# Patient Record
Sex: MALE | Race: WHITE | Employment: OTHER | URBAN - METROPOLITAN AREA
[De-identification: names, ages, dates, MRNs, and addresses within clinical notes are randomized per-mention and may not be internally consistent; named-entity substitution may affect disease eponyms.]

---

## 2023-11-06 PROBLEM — M19.019 SHOULDER ARTHRITIS: Status: ACTIVE | Noted: 2023-11-06

## 2023-11-06 PROBLEM — S43.101A: Status: ACTIVE | Noted: 2023-11-06

## 2023-11-06 RX ORDER — SIMVASTATIN 40 MG/1
40 TABLET, FILM COATED ORAL
COMMUNITY
Start: 2015-10-27

## 2023-11-06 RX ORDER — LOSARTAN POTASSIUM 50 MG/1
50 TABLET ORAL
COMMUNITY
Start: 2015-10-27 | End: 2024-01-10 | Stop reason: SINTOL

## 2023-11-07 ENCOUNTER — APPOINTMENT (OUTPATIENT)
Dept: OTOLARYNGOLOGY | Facility: CLINIC | Age: 78
End: 2023-11-07
Payer: MEDICARE

## 2023-11-07 ENCOUNTER — CLINICAL SUPPORT (OUTPATIENT)
Dept: AUDIOLOGY | Facility: CLINIC | Age: 78
End: 2023-11-07
Payer: MEDICARE

## 2023-11-07 ENCOUNTER — OFFICE VISIT (OUTPATIENT)
Dept: OTOLARYNGOLOGY | Facility: CLINIC | Age: 78
End: 2023-11-07
Payer: MEDICARE

## 2023-11-07 VITALS — WEIGHT: 195 LBS | HEIGHT: 69 IN | BODY MASS INDEX: 28.88 KG/M2

## 2023-11-07 DIAGNOSIS — H90.3 SENSORINEURAL HEARING LOSS, BILATERAL: Primary | ICD-10-CM

## 2023-11-07 DIAGNOSIS — H90.3 SENSORINEURAL HEARING LOSS (SNHL) OF BOTH EARS: Primary | ICD-10-CM

## 2023-11-07 DIAGNOSIS — Z91.89 HEALTH, HAZARD: ICD-10-CM

## 2023-11-07 PROCEDURE — 1159F MED LIST DOCD IN RCRD: CPT | Performed by: OTOLARYNGOLOGY

## 2023-11-07 PROCEDURE — 99204 OFFICE O/P NEW MOD 45 MIN: CPT | Performed by: OTOLARYNGOLOGY

## 2023-11-07 PROCEDURE — 1160F RVW MEDS BY RX/DR IN RCRD: CPT | Performed by: OTOLARYNGOLOGY

## 2023-11-07 PROCEDURE — 92626 EVAL AUD FUNCJ 1ST HOUR: CPT

## 2023-11-07 PROCEDURE — 1036F TOBACCO NON-USER: CPT | Performed by: OTOLARYNGOLOGY

## 2023-11-07 PROCEDURE — 92627 EVAL AUD FUNCJ EA ADDL 15: CPT

## 2023-11-07 RX ORDER — HYDROGEN PEROXIDE 3 %
1 SOLUTION, NON-ORAL MISCELLANEOUS DAILY
COMMUNITY
Start: 2019-10-30

## 2023-11-07 RX ORDER — LISINOPRIL 40 MG/1
TABLET ORAL
COMMUNITY
Start: 2023-09-29 | End: 2024-01-10 | Stop reason: SINTOL

## 2023-11-07 ASSESSMENT — PATIENT HEALTH QUESTIONNAIRE - PHQ9
2. FEELING DOWN, DEPRESSED OR HOPELESS: NOT AT ALL
SUM OF ALL RESPONSES TO PHQ9 QUESTIONS 1 AND 2: 0
1. LITTLE INTEREST OR PLEASURE IN DOING THINGS: NOT AT ALL

## 2023-11-07 NOTE — PROGRESS NOTES
History Of Present Illness:  Neymar Hannon is a 78 y.o. male with a history of bilateral sensorineural hearing loss and bilateral tinnitus, she was referred here today by Akosua Rodgers CNP, for cochlear implant consultation. Patient has undergone audiometric evaluation and meets criteria in both ears, per CMS. He has done research on the implant and has a general idea of the procedure itself and the expected postoperative course. He denies any otalgia, otorrhea, vertigo, or other otologic findings. There are no other contributory factors. He has a history of hypertension and hyperlipidemia.     Surgical History:  He has a past surgical history that includes Other surgical history (12/10/2019).     Allergies:  Amlodipine-olmesartan    Medications:   Current Outpatient Medications   Medication Instructions    esomeprazole (NexIUM) 20 mg DR capsule 1 capsule, oral, Daily    lisinopril 40 mg tablet     losartan (COZAAR) 50 mg, oral    simvastatin (ZOCOR) 40 mg, oral      Review of Systems:   A comprehensive 10-point review of systems was obtained including constitutional, neurological, HEENT, pulmonary, cardiovascular, genito-urinary, and other pertinent systems and was negative except as noted in the HPI.      Physical Exam:  Constitutional   General appearance: Healthy-appearing, well-nourished, well groomed, in no acute distress.   Ability to communicate: Normal communication without aids, normal voice quality.       Head and face: Atraumatic with no masses, lesions, or scarring.   Facial strength: Normal strength and symmetry, no synkinesis or facial tic.     Ears  Otoscopic examination: Bilateral normal otoscopy of the tympanic membrane     Nose: Dorsum symmetric with no visible or palpable deformities.     Oral Cavity/Mouth  Lips, teeth, and gums: Normal lips, gums, and dentition.     Oropharynx: Mucosa moist, no lesions.     Neck: Symmetrical, trachea midline. No masses visible.    "  Neurological/Psychiatric  Cranial Nerve Examination: II - XII grossly intact.  Orientation to person, place, and time: Normal.  Mood and affect: Normal.     Skin: Normal without rashes or lesions.     Pulmonary  Respiratory effort: Chest expands symmetrically.     Cardiovascular: Good peripheral pulses  Peripheral vascular system: No varicosities, carotid pulse normal, no edema. No jugular venous distension.     Extremities: Appearance of extremities: Normal. Gait normal.     Last Recorded Vitals:  Height 1.753 m (5' 9\"), weight 88.5 kg (195 lb).    Audiometry:  Reviewed, dated 11/07/23  CI evaluation, Phonak P90-UP  CNC WRS 48% in right, 40% in left  AzBio WRS 39% in right, 34% in left     Assessment/Plan   78 y.o. male with a history of bilateral sensorineural hearing loss and bilateral tinnitus, she was referred here today by Akosua Rodgers CNP, for cochlear implant consultation. We had a long discussion on the indications and the expectations of and following surgery. At this time, we will plan for right-sided cochlear implantation. Patient agreeable to proceed and schedule.     Patient was seen and discussed with Dr. Mary Kay Boudreaux.     I saw and evaluated the patient. I personally obtained the key and critical portions of the history and physical exam or was physically present for key and critical portions performed by the resident/fellow. I reviewed the resident/fellow's documentation and discussed the patient with the resident/fellow. I agree with the resident/fellow's medical decision making as documented in the note.    Mary Kay Boudreaux MD    "

## 2023-11-07 NOTE — PATIENT INSTRUCTIONS
Welcome to Dr. Boudreaux's clinic. We are here to assist you through your ENT care at [facility].  Dr. Boudreaux is an Ear surgeon. This means that she specializes in taking care of patients with complex ear problems.     Dr. Boudreaux's office number is [phone]. While you may see her at a satellite office, she has a team committed to help meet your healthcare needs at [facility]'s [facility]. This number is the most direct way to communicate with the office.     Ludmila is Dr. Boudreaux's  and she answers the office phone from 8am-4pm Mon-Fri. She can help you with many general questions and information. Questions that she cannot answer will be directed to the appropriate staff. You may need to leave a message. In this case, someone from the team will call you back.     Prem is Dr. Boudreaux's primary nurse and can be reached by calling the office. Prem is in clinic with Dr. Boudreaux's on Mondays and Tuesdays. Non-urgent calls will be returned on non-clinic days typically Thursdays.     Sometimes, other team members will also be involved in your care. These people may include dieticians, social workers, speech therapists, audiologist, neurologist, and physical therapist. Dr. Boudreaux will provide these referrals as needed. Please let her know if you would like to request a specific referral.     For your convenience, Dr. Boudreaux sees patients at several [facility] locations including [facility] and [facility] at the main campus of [facility]. While we try to make your appointments as convenient as possible, occasionally a visit to another location may be necessary to provide the best care for you. We look forward to working with you to meet your healthcare goals.     Dr. Boudreaux makes every effort to run on time for your appointments. Therefore, if you are more than 30 minutes late unrelated to a scan or another appointment such therapy or audio you will have to reschedule.

## 2023-11-07 NOTE — LETTER
2023     Akosua Rodgers, APRN-CNP  [id number]  [facility] [address]    Patient: Neymar Hannon   YOB: 1945   Date of Visit: 2023       Dear Dr. Akosua Rodgers, APRN-CNP:    Thank you for referring Neymar Hannon to me for evaluation. Below are my notes for this consultation.  If you have questions, please do not hesitate to call me. I look forward to following your patient along with you.       Sincerely,     Kyleigh Lamas, Nba      CC: Mary Kay Boudreaux MD  ______________________________________________________________________________________    COCHLEAR IMPLANT EVALUATION: ADULT (candidate)    Name:  Neymar Hannon  :  1945  Age:  78 years  Date of Evaluation:  2023    HISTORY  Neymar was seen for a cochlear implant evaluation prior to a consultation with Dr. Mary Kay Boudreaux MD. He arrives with Phonak RICs obtained at a clinic in [place]. He reports he does not do well with his hearing aids, especially in noise.     RECALL   Patient reported intermittent aural fullness, bilaterally. Neymar reported non-bothersome tinnitus, bilaterally. He currently utilizes Phonak hearing instruments, bilaterally with some benefit, though difficulty with clarity of speech and in the presence of background noise. Patient noted his last hearing evaluation was approximately a year ago. When asked, patient denied dizziness and falls.       COCHLEAR IMPLANT EVALUATION FROM 2023:    AIDED TESTING  Otoscopy indicated clear ear canals and visible tympanic membranes, bilaterally.    All testing was completed in the soundfield at 0 degrees azimuth. Pure tone testing was obtained using frequency modulating (FM) stimuli, and SRT was obtained using monitored live voice (MLV). Word and sentence recognition testing was performed with recorded material at 50 dB HL (50 dB HL = 60 dB A).    R = Right Phonak Jenna P90 UP hearing aid (opposite ear plugged)   SRT = 45 dB HL  CNC words in quiet at 50 dB HL   52%  AzBio in quiet at 50 dB HL  = 48%  AzBio in noise at 50 dB HL with 40 dB noise  = 39%  Pure tone responses from 250 Hz - 6000 Hz were in the mild to moderate hearing loss range.     L = Left  Phonak Jenna P90 UP hearing aid (opposite ear plugged)  SRT = 45 dB HL  CNC words in quiet at 50 dB HL = 36%  AzBio in quiet at 50 dB HL  = 40%  AzBio in noise at 50 dB HL with 40 dB noise  = 34%  Pure tone responses from 250 Hz - 6000 Hz were in the mild to moderate hearing loss range.     QUALITY OF LIFE (QOL) SURVEY  QOL 35:                    Raw Score                         Converted Score  Communication               26                                         41.07  Emotional                        14                                          48.21  Entertainment                 15                                           45.35  Environment                   16                                           48.85  Listening Effort                11                                           31.73       Social                               18                                           60.38  Global                              29                                           44.98     RESULTS  Due to the lack of benefit from appropriate amplification, Neymar has expressed interest in a cochlear implant. An evaluation of the right ear with appropriately fit amplification revealed 39% performance on an open-set sentence recognition test. An evaluation of the left ear with appropriately fit amplification revealed 34% performance on an open-set sentence recognition test.  The audiologic findings demonstrate that Neymar has partial residual hearing for tonal stimuli and speech detection with hearing aids, demonstrating that the auditory cranial nerve can be stimulated. Testing shows poor sentence and word understanding, even with appropriately programmed hearing aids indicating a severe to profound functional impairment.     Neymar  has an inability to orient sounds in his environment and has even more difficulty understanding speech in noisy environments.  This is highly problematic for safety purposes and for communication. Thus, the nature of his deafness has dramatically and deleteriously affected her overall quality of life, localization, and communication. Not only bilateral severe to profound sensorineural is a FDA approved indication for cochlear implantation, but recent international consensus statement demonstrate the unquestionable benefits and need of cochlear implantation for these patients (Rosemarie CA, Gracie RH, More DS, et al. Unilateral Cochlear Implants for Severe, Profound, or Moderate Sloping to Profound Bilateral Sensorineural Hearing Loss: A Systematic Review and Consensus Statements. [employer]N Otolaryngol Head Neck Surg. 2020 Aug 27. doi: 10.1001).     On this basis, unaided and aided testing indicates Neymar is an audiologic candidate for a cochlear implant in both ears. Neymar and the family have been counseled regarding the post-operative cochlear implant protocol and are properly motivated and able to participate in the post cochlear implant rehabilitation program. I, and the  CI team have determined that this surgery is medically necessary for the treatment of Neymar sensorineural hearing loss in both ears.     Neymar was given a complete  CI team packet that includes general information about cochlear implants, information about follow-up and realistic expectations. The surgical process, post-operative effects, and follow-up appointments were also discussed.  The patient was provided with the  brochures and informational handouts.  Questions were answered to the best of my ability.     The patient was informed about the need for the Pneumovax 23 and the Prevnar 13 vaccines. They must consult with their primary care doctor regarding this vaccine.     The patient will be contacted to schedule a CT Scan  and medical evaluation.  The patient is to contact the clinic with any questions or concerns.    Neymar will return to clinic to complete a device selection and discuss realistic expectations.     TREATMENT PLAN  1. Follow up with one of our neuro-otologist/CI surgeons for cochlear implant consultation.  2. Return to audiology for additional counseling and device selection pending approval by the  CI team.  3. Continue use of binaural hearing aids during all waking moments. Return to managing audiologist for earmold re-make and programming as indicated.     Time spent with patient:  1 hour and 30 minutes    Completed by:  Nba Willett, CCC-A, [facility]  Licensed Audiologist

## 2023-11-07 NOTE — PROGRESS NOTES
COCHLEAR IMPLANT EVALUATION: ADULT (candidate)    Name:  Neymar Hannon  :  1945  Age:  78 years  Date of Evaluation:  2023    HISTORY  Neymar was seen for a cochlear implant evaluation prior to a consultation with Dr. Mary Kay Boudreaux MD. He arrives with Phonak RICs obtained at a clinic in [place]. He reports he does not do well with his hearing aids, especially in noise.     RECALL   Patient reported intermittent aural fullness, bilaterally. Neymar reported non-bothersome tinnitus, bilaterally. He currently utilizes Phonak hearing instruments, bilaterally with some benefit, though difficulty with clarity of speech and in the presence of background noise. Patient noted his last hearing evaluation was approximately a year ago. When asked, patient denied dizziness and falls.       COCHLEAR IMPLANT EVALUATION FROM 2023:    AIDED TESTING  Otoscopy indicated clear ear canals and visible tympanic membranes, bilaterally.    All testing was completed in the soundfield at 0 degrees azimuth. Pure tone testing was obtained using frequency modulating (FM) stimuli, and SRT was obtained using monitored live voice (MLV). Word and sentence recognition testing was performed with recorded material at 50 dB HL (50 dB HL = 60 dB A).    R = Right Phonak Jenna P90 UP hearing aid (opposite ear plugged)   SRT = 45 dB HL  CNC words in quiet at 50 dB HL  52%  AzBio in quiet at 50 dB HL  = 48%  AzBio in noise at 50 dB HL with 40 dB noise  = 39%  Pure tone responses from 250 Hz - 6000 Hz were in the mild to moderate hearing loss range.     L = Left  Phonak Jenna P90 UP hearing aid (opposite ear plugged)  SRT = 45 dB HL  CNC words in quiet at 50 dB HL = 36%  AzBio in quiet at 50 dB HL  = 40%  AzBio in noise at 50 dB HL with 40 dB noise  = 34%  Pure tone responses from 250 Hz - 6000 Hz were in the mild to moderate hearing loss range.     QUALITY OF LIFE (QOL) SURVEY  QOL 35:                    Raw Score                          Converted Score  Communication               26                                         41.07  Emotional                        14                                          48.21  Entertainment                 15                                           45.35  Environment                   16                                           48.85  Listening Effort                11                                           31.73       Social                               18                                           60.38  Global                              29                                           44.98     RESULTS  Due to the lack of benefit from appropriate amplification, Neymar has expressed interest in a cochlear implant. An evaluation of the right ear with appropriately fit amplification revealed 39% performance on an open-set sentence recognition test. An evaluation of the left ear with appropriately fit amplification revealed 34% performance on an open-set sentence recognition test.  The audiologic findings demonstrate that Neymar has partial residual hearing for tonal stimuli and speech detection with hearing aids, demonstrating that the auditory cranial nerve can be stimulated. Testing shows poor sentence and word understanding, even with appropriately programmed hearing aids indicating a severe to profound functional impairment.     Neymar has an inability to orient sounds in his environment and has even more difficulty understanding speech in noisy environments.  This is highly problematic for safety purposes and for communication. Thus, the nature of his deafness has dramatically and deleteriously affected her overall quality of life, localization, and communication. Not only bilateral severe to profound sensorineural is a FDA approved indication for cochlear implantation, but recent international consensus statement demonstrate the unquestionable benefits and need of cochlear implantation for these  patients (Rosemarie CA, Gracie RH, More DS, et al. Unilateral Cochlear Implants for Severe, Profound, or Moderate Sloping to Profound Bilateral Sensorineural Hearing Loss: A Systematic Review and Consensus Statements. AUT[employer]N Otolaryngol Head Neck Surg. 2020 Aug 27. doi: 10.1001).     On this basis, unaided and aided testing indicates Neymar is an audiologic candidate for a cochlear implant in both ears. Neymar and the family have been counseled regarding the post-operative cochlear implant protocol and are properly motivated and able to participate in the post cochlear implant rehabilitation program. I, and the  CI team have determined that this surgery is medically necessary for the treatment of Neymar sensorineural hearing loss in both ears.     Neymar was given a complete  CI team packet that includes general information about cochlear implants, information about follow-up and realistic expectations. The surgical process, post-operative effects, and follow-up appointments were also discussed.  The patient was provided with the  brochures and informational handouts.  Questions were answered to the best of my ability.     The patient was informed about the need for the Pneumovax 23 and the Prevnar 13 vaccines. They must consult with their primary care doctor regarding this vaccine.     The patient will be contacted to schedule a CT Scan and medical evaluation.  The patient is to contact the clinic with any questions or concerns.    Neymar will return to clinic to complete a device selection and discuss realistic expectations.     TREATMENT PLAN  1. Follow up with one of our neuro-otologist/CI surgeons for cochlear implant consultation.  2. Return to audiology for additional counseling and device selection pending approval by the  CI team.  3. Continue use of binaural hearing aids during all waking moments. Return to managing audiologist for earmold re-make and programming as indicated.     Time  spent with patient:  1 hour and 30 minutes    Completed by:  Nba Willett, CCC-A, [facility]  Licensed Audiologist

## 2023-11-10 PROBLEM — H90.3 SENSORINEURAL HEARING LOSS (SNHL) OF BOTH EARS: Status: ACTIVE | Noted: 2023-11-07

## 2023-11-10 RX ORDER — SODIUM CHLORIDE 9 MG/ML
100 INJECTION, SOLUTION INTRAVENOUS CONTINUOUS
Status: CANCELLED | OUTPATIENT
Start: 2023-11-10

## 2023-11-20 ENCOUNTER — TELEPHONE (OUTPATIENT)
Dept: OTOLARYNGOLOGY | Facility: CLINIC | Age: 78
End: 2023-11-20
Payer: MEDICARE

## 2023-11-20 DIAGNOSIS — H90.3 SENSORINEURAL HEARING LOSS (SNHL) OF BOTH EARS: ICD-10-CM

## 2023-11-20 NOTE — TELEPHONE ENCOUNTER
Patient forwarded vaccine records from outside PCP. Patient had Prevnar 13 in 2015. He will just need an updated Prevnar 20 vaccine prior to his cochlear implant surgery in Jan 2024. Patient is aware. He requests this to be sent to the [facility] pharmacy on file. Order prepped and waiting physician signature.

## 2023-12-04 ENCOUNTER — EVALUATION (OUTPATIENT)
Dept: SPEECH THERAPY | Facility: CLINIC | Age: 78
End: 2023-12-04
Payer: MEDICARE

## 2023-12-04 DIAGNOSIS — R48.8 OTHER SYMBOLIC DYSFUNCTIONS: ICD-10-CM

## 2023-12-04 DIAGNOSIS — H90.3 SENSORINEURAL HEARING LOSS (SNHL) OF BOTH EARS: Primary | ICD-10-CM

## 2023-12-04 DIAGNOSIS — R41.841 COGNITIVE COMMUNICATION DEFICIT: ICD-10-CM

## 2023-12-04 PROCEDURE — 92523 SPEECH SOUND LANG COMPREHEN: CPT | Mod: GN

## 2023-12-04 ASSESSMENT — PAIN SCALES - GENERAL: PAINLEVEL_OUTOF10: 0 - NO PAIN

## 2023-12-04 ASSESSMENT — PAIN - FUNCTIONAL ASSESSMENT: PAIN_FUNCTIONAL_ASSESSMENT: 0-10

## 2023-12-04 NOTE — PROGRESS NOTES
Speech-Language Pathology    Auditory Evaluation      Patient Name: Neymar Hannon  MRN: [id number]  Today's Date: 12/5/2023     Time Calculation  Start Time: 1300  Stop Time: 1430  Time Calculation (min): 90 min      Current Problem:  Patient Active Problem List   Diagnosis    Acromioclavicular joint separation, type 3, right, initial encounter    Shoulder arthritis    Sensorineural hearing loss (SNHL) of both ears    Cognitive communication deficit    Other symbolic dysfunctions      Recommendations:  NORMAL Cognitive Assessment  Recommendations: Aural Rehab 1-4 weeks S/P Cochlear Implant Activation       Auditory Skill Assessment:       Auditory Plan of Care:  Recommend Treatment: No  Discussed POC: Patient  The patient's family/caregiver was educated regarding appropriate motivation and expectations for a cochlear implant (CI) and the CI process.: Yes      Subjective   Current Problem: NORMAL Cognitive Assessment    General Visit Information:  Recommendations: Aural Rehab 1-4 weeks S/P Cochlear Implant Activation  Living Environment: Home  Language at home: Spoken English  Arrival: Independent  Reason for Referral: Cognitive assessment as part of cochlear implant candidacy determination  Certification Period Start Date: 12/04/23  Certification Period End Date: 03/04/24  Prior Level of Function: WFL    Provider:  Dr. Boudreaux- ENT  Kyleigh Lamas- Audiology     Pain:  Pain Assessment  Pain Assessment: 0-10  Pain Score: 0 - No pain    Objective     Baseline Observations:  Hearing Loss: Progressive (Over time)  Duration of Hearing Loss: 20+ years  Etiology of hearing loss: unknown  Current Amplification: Worn during evaluation  Left Ear: Hearing Aid  Right Ear: Hearing Aid  Amplification Worn During Evaluation: Yes  Hours Worn: Whenever awake    Resonance-Voice Assessment:  Assessments Used: Informal  Articulation Screening: Age appropriate  Nasal Resonance: Normal  Nasal Air Emissions: Not present  Voice: Normal  Speech  Inteligibility: 100%    Cognition Skill Assessment:  CLQT: Personal Facts Score: 8  CLQT: Personal Facts Function: WFL  CLQT: Symbol Cancellation Score: 12  CLQT: Symbol Cancellation Function: WFL  CLQT: Confrontational Naming Score: 10  CLQT: Confrontational Naming Function: WFL  CLQT: Clock Drawing Score: 13  CLQT: Clock Drawing Function: WFL  CLQT: Story Retelling Score: 8  CLQT: Story Retelling Function: WFL  CLQT: Symbol Trails Score: 8  CLQT: Symbol Trails Function: WFL  CLQT: Generative Naming Score: 4  CLQT: Generative Naming Function: WFL  CLQT: Design Memory Score: 6  CLQT: Design Memory Function: WFL  CLQT: Mazes Score: 8  CLQT: Mazes Function: WFL  CLQT: Design Generation Score: 8  CLQT: Design Generation Function: WFL  CLQT: Auditory Comprehension Score: 19  CLQT: Auditory Comprehension Function: WFL    SRCD - Severity Rating Cognitive Domains:  SRCD: Attention Score: 200  SRCD: Attention Ratin  SRCD: Attention Function: WFL  SRCD: Memory Score: 168  SRCD: Memory Ratin  SRCD: Memory Function: WFL  SRCD: Executive Functions Score: 28  SRCD: Executive Functions Ratin  SRCD: Executive Functions Function: WFL  SRCD: Language Score: 30  SRCD: Language Ratin  SRCD: Language Function: WFL  SRCD: Visuospatial Skills Score: 96  SRCD: Visuospatial Skills Ratin  SRCD:Visuospatial Skills Function: WFL  SRCD: Non-Linguistic Cognition Score: 42  SRCD: Non-Linguistic Cognition Ratin  SRCD: Non-Linguistic Cognition Function: WFL  SRCD: Linguistic Cognition Score: 49  SRCD: Linguistic Cognition Ratin  SRCD: Linguistic Cognition Function: WFL  SRCD: Clock Drawing Severity Rating Score: 13  SRCD: Clock Drawing Severity Rating Rate: 4  SRCD: Clock Drawing Severity Rating Function: WFL  SRCD: Generative Naming Perseveration Ratio Score: 0.00  SRCD: Generative Naming Perseveration Ratio Function: WFL  SRCD: Composite Severity Score Rating Function: 4.0  SRCD: Composite Severity Score Rating  Function: WFL    Auditory Education:  Treatment Performed Today: No  Individual(s) Educated: Patient  Verbal Education Provided: Results of Testing  Response to Educaton: Verbalized Understanding  Patient's Learning Preference(s): Explanation/Discussion  Patient/caregiver verbalized understanding and agreement.: Yes       12/04/23 [facility]   Adult Outpatient Education   Individual(s) Educated Patient   Verbal Education  test results, aural rehab after cochlear implantation   Diagnosis and Precautions Cognition within normal limits   Risk and Benefits Discussed with Patient/Caregiver/Other yes   Patient/Caregiver Demonstrated Understanding yes   Plan of Care Discussed and Agreed Upon yes   Patient Response to Education Patient/Caregiver Asked Appropriate Questions;Patient/Caregiver Performed Return Demonstration of Exercises/Activities;Patient/Caregiver Verbalized Understanding of Information   Education Comment Answered questions about Cochlear Implants, Aural Rehab, and Cochlear Implant Expectations

## 2023-12-05 PROBLEM — R48.8 OTHER SYMBOLIC DYSFUNCTIONS: Status: ACTIVE | Noted: 2023-12-05

## 2023-12-05 PROBLEM — R41.841 COGNITIVE COMMUNICATION DEFICIT: Status: ACTIVE | Noted: 2023-12-05

## 2023-12-05 ASSESSMENT — ENCOUNTER SYMPTOMS
DEPRESSION: 0
LOSS OF SENSATION IN FEET: 0
OCCASIONAL FEELINGS OF UNSTEADINESS: 0

## 2023-12-08 ENCOUNTER — CLINICAL SUPPORT (OUTPATIENT)
Dept: AUDIOLOGY | Facility: CLINIC | Age: 78
End: 2023-12-08
Payer: MEDICARE

## 2023-12-08 DIAGNOSIS — H90.3 SENSORINEURAL HEARING LOSS, BILATERAL: Primary | ICD-10-CM

## 2023-12-08 PROCEDURE — 92700 UNLISTED ORL SERVICE/PX: CPT

## 2023-12-08 NOTE — PROGRESS NOTES
COCHLEAR IMPLANT EVALUATION: ADULT (candidate)    Name:  Neymar Hannon  :  1945  Age:  78 years  Date of Evaluation:  2023    HISTORY  Neymar was seen for device selection after her was determined to be a cochlear implant candidate on 2023. After a consultation with Dr. Mary Kay Boudreaux MD he reports they will proceed with a right sided implant. He arrives with Phonak RICs obtained at a clinic in [place]. He reports he does not do well with his hearing aids, especially in noise.     RECALL   Patient reported intermittent aural fullness, bilaterally. Neymar reported non-bothersome tinnitus, bilaterally. He currently utilizes Phonak hearing instruments, bilaterally with some benefit, though difficulty with clarity of speech and in the presence of background noise. Patient noted his last hearing evaluation was approximately a year ago. When asked, patient denied dizziness and falls.     DEVICE SELECTION AND REALISTIC EXPECTATIONS FROM 2023  Order:  Beige EAS N8  Extra Battery  TV streamer  Mini Eric    Realistic expectations were discussed and form completed.  Questions answered in their entirety to the best of my ability.  Passed questions on to Nurse Sheba and Prem that are out of my scope. (Wedding ring and flight restrictions)  Had Lisa scheduled with Akosua Helton for 3/11/2024 as he wants to start [facility]    COCHLEAR IMPLANT EVALUATION FROM 2023:    AIDED TESTING  Otoscopy indicated clear ear canals and visible tympanic membranes, bilaterally.    All testing was completed in the soundfield at 0 degrees azimuth. Pure tone testing was obtained using frequency modulating (FM) stimuli, and SRT was obtained using monitored live voice (MLV). Word and sentence recognition testing was performed with recorded material at 50 dB HL (50 dB HL = 60 dB A).    R = Right Phonak Jenna P90 UP hearing aid (opposite ear plugged)   SRT = 45 dB HL  CNC words in quiet at 50 dB HL  52%  AzBio in quiet at 50 dB  Covid negative.  Discussed with mom.    HL  = 48%  AzBio in noise at 50 dB HL with 40 dB noise  = 39%  Pure tone responses from 250 Hz - 6000 Hz were in the mild to moderate hearing loss range.     L = Left  Phonak Jenna P90 UP hearing aid (opposite ear plugged)  SRT = 45 dB HL  CNC words in quiet at 50 dB HL = 36%  AzBio in quiet at 50 dB HL  = 40%  AzBio in noise at 50 dB HL with 40 dB noise  = 34%  Pure tone responses from 250 Hz - 6000 Hz were in the mild to moderate hearing loss range.     QUALITY OF LIFE (QOL) SURVEY  QOL 35:                    Raw Score                         Converted Score  Communication               26                                         41.07  Emotional                        14                                          48.21  Entertainment                 15                                           45.35  Environment                   16                                           48.85  Listening Effort                11                                           31.73       Social                               18                                           60.38  Global                              29                                           44.98     RESULTS  Due to the lack of benefit from appropriate amplification, Neymar has expressed interest in a cochlear implant. An evaluation of the right ear with appropriately fit amplification revealed 39% performance on an open-set sentence recognition test. An evaluation of the left ear with appropriately fit amplification revealed 34% performance on an open-set sentence recognition test.  The audiologic findings demonstrate that Neymar has partial residual hearing for tonal stimuli and speech detection with hearing aids, demonstrating that the auditory cranial nerve can be stimulated. Testing shows poor sentence and word understanding, even with appropriately programmed hearing aids indicating a severe to profound functional impairment.     Neymar has an inability to orient  sounds in his environment and has even more difficulty understanding speech in noisy environments.  This is highly problematic for safety purposes and for communication. Thus, the nature of his deafness has dramatically and deleteriously affected her overall quality of life, localization, and communication. Not only bilateral severe to profound sensorineural is a FDA approved indication for cochlear implantation, but recent international consensus statement demonstrate the unquestionable benefits and need of cochlear implantation for these patients (Rosemarie CA, Gracie RH, More DS, et al. Unilateral Cochlear Implants for Severe, Profound, or Moderate Sloping to Profound Bilateral Sensorineural Hearing Loss: A Systematic Review and Consensus Statements. AUTUMN Otolaryngol Head Neck Surg. 2020 Aug 27. doi: 10.1001).     On this basis, unaided and aided testing indicates Neymar is an audiologic candidate for a cochlear implant in both ears. Neymar and the family have been counseled regarding the post-operative cochlear implant protocol and are properly motivated and able to participate in the post cochlear implant rehabilitation program. I, and the  CI team have determined that this surgery is medically necessary for the treatment of Neymar sensorineural hearing loss in both ears.     Neymar was given a complete  CI team packet that includes general information about cochlear implants, information about follow-up and realistic expectations. The surgical process, post-operative effects, and follow-up appointments were also discussed.  The patient was provided with the  brochures and informational handouts.  Questions were answered to the best of my ability.     The patient was informed about the need for the Pneumovax 23 and the Prevnar 13 vaccines. They must consult with their primary care doctor regarding this vaccine.     The patient will be contacted to schedule a CT Scan and medical evaluation.  The  patient is to contact the clinic with any questions or concerns.    Neymar will return to clinic to complete a device selection and discuss realistic expectations.     TREATMENT PLAN  1. Follow up with one of our neuro-otologist/CI surgeons for cochlear implant consultation.  2. Return to audiology for additional counseling and device selection pending approval by the  CI team.  3. Continue use of binaural hearing aids during all waking moments. Return to managing audiologist for earmold re-make and programming as indicated.     Time spent with patient:  1 hour and 30 minutes    Completed by:  Nba Willett, CCC-A, [facility]  Licensed Audiologist

## 2023-12-08 NOTE — LETTER
2023     Mary Kay Boudreaux MD  [address]  [place] [address]    Patient: Neymar Hannon   YOB: 1945   Date of Visit: 2023       Dear Dr. Mary Kay Boudreaux MD:    Thank you for referring Neymar Hannon to me for evaluation. Below are my notes for this consultation.  If you have questions, please do not hesitate to call me. I look forward to following your patient along with you.       Sincerely,     Nba Allen      CC: No Recipients  ______________________________________________________________________________________    COCHLEAR IMPLANT EVALUATION: ADULT (candidate)    Name:  Neymar Hannon  :  1945  Age:  78 years  Date of Evaluation:  2023    HISTORY  Neymar was seen for device selection after her was determined to be a cochlear implant candidate on 2023. After a consultation with Dr. Mary Kay Boudreaux MD he reports they will proceed with a right sided implant. He arrives with Phonak RICs obtained at a clinic in [place]. He reports he does not do well with his hearing aids, especially in noise.     RECALL   Patient reported intermittent aural fullness, bilaterally. Neymar reported non-bothersome tinnitus, bilaterally. He currently utilizes Phonak hearing instruments, bilaterally with some benefit, though difficulty with clarity of speech and in the presence of background noise. Patient noted his last hearing evaluation was approximately a year ago. When asked, patient denied dizziness and falls.     DEVICE SELECTION AND REALISTIC EXPECTATIONS FROM 2023  Order:  Beige EAS N8  Extra Battery  TV streamer  Mini Eric    Realistic expectations were discussed and form completed.  Questions answered in their entirety to the best of my ability.  Passed questions on to Nurse Sheba and Prem that are out of my scope. (Wedding ring and flight restrictions)  Had Lisa scheduled with Akosua Helton for 3/11/2024 as he wants to start [facility] ASAP    COCHLEAR  IMPLANT EVALUATION FROM 11/7/2023:    AIDED TESTING  Otoscopy indicated clear ear canals and visible tympanic membranes, bilaterally.    All testing was completed in the soundfield at 0 degrees azimuth. Pure tone testing was obtained using frequency modulating (FM) stimuli, and SRT was obtained using monitored live voice (MLV). Word and sentence recognition testing was performed with recorded material at 50 dB HL (50 dB HL = 60 dB A).    R = Right Phonak Jenna P90 UP hearing aid (opposite ear plugged)   SRT = 45 dB HL  CNC words in quiet at 50 dB HL  52%  AzBio in quiet at 50 dB HL  = 48%  AzBio in noise at 50 dB HL with 40 dB noise  = 39%  Pure tone responses from 250 Hz - 6000 Hz were in the mild to moderate hearing loss range.     L = Left  Phonak Jenna P90 UP hearing aid (opposite ear plugged)  SRT = 45 dB HL  CNC words in quiet at 50 dB HL = 36%  AzBio in quiet at 50 dB HL  = 40%  AzBio in noise at 50 dB HL with 40 dB noise  = 34%  Pure tone responses from 250 Hz - 6000 Hz were in the mild to moderate hearing loss range.     QUALITY OF LIFE (QOL) SURVEY  QOL 35:                    Raw Score                         Converted Score  Communication               26                                         41.07  Emotional                        14                                          48.21  Entertainment                 15                                           45.35  Environment                   16                                           48.85  Listening Effort                11                                           31.73       Social                               18                                           60.38  Global                              29                                           44.98     RESULTS  Due to the lack of benefit from appropriate amplification, Neymar has expressed interest in a cochlear implant. An evaluation of the right ear with appropriately fit amplification revealed  39% performance on an open-set sentence recognition test. An evaluation of the left ear with appropriately fit amplification revealed 34% performance on an open-set sentence recognition test.  The audiologic findings demonstrate that Neymar has partial residual hearing for tonal stimuli and speech detection with hearing aids, demonstrating that the auditory cranial nerve can be stimulated. Testing shows poor sentence and word understanding, even with appropriately programmed hearing aids indicating a severe to profound functional impairment.     Neymar has an inability to orient sounds in his environment and has even more difficulty understanding speech in noisy environments.  This is highly problematic for safety purposes and for communication. Thus, the nature of his deafness has dramatically and deleteriously affected her overall quality of life, localization, and communication. Not only bilateral severe to profound sensorineural is a FDA approved indication for cochlear implantation, but recent international consensus statement demonstrate the unquestionable benefits and need of cochlear implantation for these patients (Rosemarie CA, Gracie RH, More DS, et al. Unilateral Cochlear Implants for Severe, Profound, or Moderate Sloping to Profound Bilateral Sensorineural Hearing Loss: A Systematic Review and Consensus Statements. AUTUMN Otolaryngol Head Neck Surg. 2020 Aug 27. doi: 10.1001).     On this basis, unaided and aided testing indicates Neymar is an audiologic candidate for a cochlear implant in both ears. Neymar and the family have been counseled regarding the post-operative cochlear implant protocol and are properly motivated and able to participate in the post cochlear implant rehabilitation program. I, and the  CI team have determined that this surgery is medically necessary for the treatment of Neymar sensorineural hearing loss in both ears.     Neymar was given a complete  CI team packet that  includes general information about cochlear implants, information about follow-up and realistic expectations. The surgical process, post-operative effects, and follow-up appointments were also discussed.  The patient was provided with the  brochures and informational handouts.  Questions were answered to the best of my ability.     The patient was informed about the need for the Pneumovax 23 and the Prevnar 13 vaccines. They must consult with their primary care doctor regarding this vaccine.     The patient will be contacted to schedule a CT Scan and medical evaluation.  The patient is to contact the clinic with any questions or concerns.    Neymar will return to clinic to complete a device selection and discuss realistic expectations.     TREATMENT PLAN  1. Follow up with one of our neuro-otologist/CI surgeons for cochlear implant consultation.  2. Return to audiology for additional counseling and device selection pending approval by the  CI team.  3. Continue use of binaural hearing aids during all waking moments. Return to managing audiologist for earmold re-make and programming as indicated.     Time spent with patient:  1 hour and 30 minutes    Completed by:  Nba Willett, CCC-A, [facility]  Licensed Audiologist

## 2023-12-11 DIAGNOSIS — Z79.2 PROPHYLACTIC ANTIBIOTIC: Primary | ICD-10-CM

## 2023-12-11 RX ORDER — AMOXICILLIN 500 MG/1
2000 TABLET, FILM COATED ORAL ONCE
Qty: 4 TABLET | Refills: 0 | Status: SHIPPED | OUTPATIENT
Start: 2023-12-11 | End: 2023-12-11

## 2023-12-29 DIAGNOSIS — Z96.60 STATUS POST JOINT REPLACEMENT: Primary | ICD-10-CM

## 2023-12-29 RX ORDER — AMOXICILLIN 500 MG/1
2000 TABLET, FILM COATED ORAL ONCE
Qty: 4 TABLET | Refills: 0 | Status: SHIPPED | OUTPATIENT
Start: 2023-12-29 | End: 2023-12-29

## 2024-01-10 ENCOUNTER — LAB (OUTPATIENT)
Dept: LAB | Facility: LAB | Age: 79
End: 2024-01-10
Payer: MEDICARE

## 2024-01-10 DIAGNOSIS — Z41.9 SURGERY, ELECTIVE: ICD-10-CM

## 2024-01-10 DIAGNOSIS — H90.3 SENSORINEURAL HEARING LOSS (SNHL) OF BOTH EARS: ICD-10-CM

## 2024-01-10 DIAGNOSIS — E78.2 MIXED HYPERLIPIDEMIA: ICD-10-CM

## 2024-01-10 LAB
ANION GAP SERPL CALC-SCNC: 14 MMOL/L (ref 10–20)
BUN SERPL-MCNC: 21 MG/DL (ref 6–23)
CALCIUM SERPL-MCNC: 9.5 MG/DL (ref 8.6–10.6)
CHLORIDE SERPL-SCNC: 104 MMOL/L (ref 98–107)
CO2 SERPL-SCNC: 27 MMOL/L (ref 21–32)
CREAT SERPL-MCNC: 1.34 MG/DL (ref 0.5–1.3)
EGFRCR SERPLBLD CKD-EPI 2021: 54 ML/MIN/1.73M*2
ERYTHROCYTE [DISTWIDTH] IN BLOOD BY AUTOMATED COUNT: 13.4 % (ref 11.5–14.5)
GLUCOSE SERPL-MCNC: 104 MG/DL (ref 74–99)
HCT VFR BLD AUTO: 42 % (ref 41–52)
HGB BLD-MCNC: 13.3 G/DL (ref 13.5–17.5)
MCH RBC QN AUTO: 28.2 PG (ref 26–34)
MCHC RBC AUTO-ENTMCNC: 31.7 G/DL (ref 32–36)
MCV RBC AUTO: 89 FL (ref 80–100)
NRBC BLD-RTO: 0 /100 WBCS (ref 0–0)
PLATELET # BLD AUTO: 174 X10*3/UL (ref 150–450)
POTASSIUM SERPL-SCNC: 4.3 MMOL/L (ref 3.5–5.3)
RBC # BLD AUTO: 4.72 X10*6/UL (ref 4.5–5.9)
SODIUM SERPL-SCNC: 141 MMOL/L (ref 136–145)
WBC # BLD AUTO: 8.9 X10*3/UL (ref 4.4–11.3)

## 2024-01-10 PROCEDURE — 85027 COMPLETE CBC AUTOMATED: CPT

## 2024-01-10 PROCEDURE — 80048 BASIC METABOLIC PNL TOTAL CA: CPT

## 2024-01-10 PROCEDURE — 36415 COLL VENOUS BLD VENIPUNCTURE: CPT

## 2024-01-10 RX ORDER — LISINOPRIL 40 MG/1
40 TABLET ORAL DAILY
COMMUNITY

## 2024-01-10 NOTE — PREPROCEDURE INSTRUCTIONS
Pre-Op Instructions & Checklist   Your surgery has been scheduled at [facility] at [address] [place], [address], [address], in the [facility]. Parking is to the left of the main entrance.  You will be contacted about the time of your surgery the day before your surgery. If you are unable to answer the phone, a detailed voicemail message will be left. Make sure that your voicemail box is not full so a message can be left. If you have not received a call by 3:00 pm you may call [phone] between the hours of 3:00 and 4:00 pm. Please be available by phone the night before/day of surgery in case there is a change in the schedule which may require you to arrive earlier/later.  14 DAYS BEFORE SURGERY STOP TAKING WEIGHT LOSS MEDICATIONS     7 DAYS BEFORE SURGERY STOP THESE MEDICATIONS:  Multiple Vitamins containing Vitamin E  Herbal supplements, Fish Oil, garlic pills, turmeric, CoQ enzyme  Stop taking aspirin, and aspirin-containing products, as well as NSAID's such as Advil, Motrin, Aleve, and Ibuprofen. Tylenol is okay to take for pain relief.  If you are currently taking Coumadin/Warfarin, we will have to coordinate that with your PCP &/or the Anticoagulation Clinic.    THE DAY BEFORE SURGERY:  *Do not eat any food after midnight the night before surgery.   *You are permitted to have clear liquids such as water, apple juice, plain tea or coffee (no milk or creamer), clear electrolyte-replenishing drinks such as Pedialyte, Gatorade, or Powerade (not yogurt or pulp-containing smoothies or juices such as orange juice) up to 2 hours before your surgery.    DAY OF SURGERY, TAKE THESE MEDICATIONS with a small sip of water (if it is not listed, do not take it):  Take: Esomeprazole (Nexium); simvastatin                 ON THE MORNING OF SURGERY:  *Shower either the night before your surgery or the morning of your surgery  *Do not use moisturizers, creams, lotions or perfume, or  make-up.  *Wear comfortable, loose fitting clothing.   *All jewelry and valuables should be left at home.  *Prosthetic devices such as contact lenses, hearing aids, dentures, eyelash extensions, hairpins and body piercings must be removed before surgery. Bring containers for eyeglasses/contacts, dentures, or hearing aids with you.  Diabetics: Please check fasting blood sugars upon waking up.  If fasting blood sugars are <80ml/dl, please drink 100ml/3oz. of apple juice no later than 2 hours prior to surgery.    BRING WITH YOU:   *Photo ID and insurance card  *Current list of medicines and allergies  *Pacemaker/Defibrillator/Heart stent cards  *Copy of your complete Advanced Directive/DHPOA-if applicable    SMOKING:  *Quitting smoking can make a huge difference to your health and recovery from surgery.    *If you need help with quitting, call [phone].  Alcohol:  *No alcoholic beverages for 48 hours before surgery.    AFTER OUTPATIENT SURGERY: Anu Jalloh  *A responsible adult MUST accompany you at the time of discharge and stay with you for 24 hours after your surgery.  *You may NOT drive yourself home after surgery.  *You may use a taxi or ride sharing service (D[employer], Uber) to return home ONLY if you are accompanied by a friend or family member.  *Instructions for resuming your medications will be provided by your surgeon.    CONTACT SURGEON'S OFFICE IF YOU DEVELOP:  * Fever =/> 100.4 F   * New respiratory symptoms (e.g. cough, shortness of breath, respiratory distress, sore throat)  * Recent loss of taste or smell  *Flu like symptoms such as headache, fatigue or gastrointestinal symptoms  * If you develop any open sores, shingles, burning or painful urination   AND/OR:  * You no longer wish to have the surgery.  * Any other personal circumstances change that may lead to the need to cancel or defer this surgery.  *You were admitted to any hospital within one week of your planned procedure.    If you have  any questions regarding these preoperative instructions you may call [phone]. If you have questions regarding you surgical procedure, or post-operative care/recovery please call your surgeon's office.    Link to [facility]  [email or web address]

## 2024-01-10 NOTE — CPM/PAT H&P
CPM/PAT Evaluation       Name: Neymar Hannon (Neymar Hannon)  /Age: 1945/78 y.o.     TELEMEDICINE ENCOUNTER  Patient was contacted by telephone for preadmission testing perioperative risk assessment prior to surgery.    CHIEF COMPLAINT  Bilateral sensorineural hearing loss    HPI  Neymar Hannon is a 78-year-old male complaining of bilateral hearing loss that has been getting worse over time.  Patient currently has bilateral hearing aids which are no longer as effective as they once were in assisting with speech understanding.  Patient denies any exposure to ototoxic drugs, history of chronic loud noise exposure, trauma to ears, or prior ear surgery. Patient has undergone audiometric evaluation and meets criteria in both ears for cochlear implant.  Patient now scheduled for right-sided cochlear implant on 2024 at [facility].    ACTIVE PROBLEMS  Patient Active Problem List   Diagnosis    Acromioclavicular joint separation, type 3, right, initial encounter    Shoulder arthritis    Sensorineural hearing loss (SNHL) of both ears    Cognitive communication deficit    Other symbolic dysfunctions     PAST MEDICAL HISTORY  Past Medical History:   Diagnosis Date    Acid reflux     Hyperlipidemia     Other specified health status     No pertinent past medical history     SURGICAL HISTORY  Past Surgical History:   Procedure Laterality Date    ADENOIDECTOMY      COLONOSCOPY      OTHER SURGICAL HISTORY Left 12/10/2019    shoulder arthroplasty    TONSILLECTOMY       ANESTHESIA HISTORY  Denies problems with anesthesia in the past such as PONV, prolonged sedation, awareness, dental damage, aspiration, cardiac arrest, difficult intubation, or unexpected hospital admissions.  Denies family history of malignant hyperthermia, or pseudocholinesterase deficiency.    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  Never smoker; EtOH: About 1 drink every 2 weeks; denies recreational drug  use.  Patient states he does not live a sedentary life.  He walks a mile most days and does regular exercise.  Patient states he is able to do moderate ADLs such as housework, light yard work.  Patient denies chest pain, JETER.  METS 4    FAMILY HISTORY  Family History   Problem Relation Name Age of Onset    Other (heart problem) Other family history      ALLERGIES  Allergies   Allergen Reactions    Amlodipine-Olmesartan Rash     MEDICATIONS  No current facility-administered medications for this encounter.    Current Outpatient Medications:     esomeprazole (NexIUM) 20 mg DR capsule, Take 1 capsule (20 mg) by mouth once daily., Disp: , Rfl:     lisinopril 40 mg tablet, Take 1 tablet (40 mg) by mouth once daily., Disp: , Rfl:     simvastatin (Zocor) 40 mg tablet, Take 1 tablet (40 mg) by mouth., Disp: , Rfl:     PHYSICAL EXAM  Deferred    AIRWAY EXAM  Deferred    VITALS  No vitals taken for telemedicine visit  Height: 5 feet 9 inches; weight: 195 pounds; BMI: 28.80    LABS  Lab Results   Component Value Date    WBC 6.9 07/05/2022    HGB 14.5 07/05/2022    HCT 44.9 07/05/2022    MCV 93 07/05/2022     (L) 07/05/2022       IMAGING  EKG from 07/05/2022  Indications  Priority: Routine  htn     Interpretation Summary    Sinus bradycardia with 1st degree AV block  Low voltage QRS  Borderline ECG  No previous ECGs available  Confirmed by Don Terry ([id number]) on 7/6/2022 9:58:55 AM  Measurements    P Axis 24 degrees         P Offset 166 ms         MI Interval 220 ms         Q Onset 227 ms         QTC Calculation(Bazett) 399 ms         QTC Fredericia 408 ms         R Axis -2 degrees          T Axis 11 degrees         Ventricular Rate 53 BPM         Atrial Rate 53 BPM         P Onset 117 ms          QRS Count 9 beats         QRS Duration 84 ms         QT Interval 426 ms         T Offset 440 ms               ASSESSMENT/PLAN  Bilateral sensorineural hearing loss  Right-sided cochlear implant      This note was created in part  upon personal review of patient's medical records.

## 2024-01-25 ENCOUNTER — ANESTHESIA EVENT (OUTPATIENT)
Dept: OPERATING ROOM | Facility: CLINIC | Age: 79
End: 2024-01-25
Payer: MEDICARE

## 2024-01-26 ENCOUNTER — DOCUMENTATION (OUTPATIENT)
Dept: AUDIOLOGY | Facility: HOSPITAL | Age: 79
End: 2024-01-26

## 2024-01-26 ENCOUNTER — HOSPITAL ENCOUNTER (OUTPATIENT)
Facility: CLINIC | Age: 79
Setting detail: OUTPATIENT SURGERY
Discharge: HOME | End: 2024-01-26
Attending: OTOLARYNGOLOGY | Admitting: OTOLARYNGOLOGY
Payer: MEDICARE

## 2024-01-26 ENCOUNTER — ANESTHESIA (OUTPATIENT)
Dept: OPERATING ROOM | Facility: CLINIC | Age: 79
End: 2024-01-26
Payer: MEDICARE

## 2024-01-26 VITALS
BODY MASS INDEX: 28.31 KG/M2 | DIASTOLIC BLOOD PRESSURE: 75 MMHG | WEIGHT: 197.75 LBS | HEART RATE: 70 BPM | TEMPERATURE: 97.3 F | OXYGEN SATURATION: 98 % | SYSTOLIC BLOOD PRESSURE: 159 MMHG | RESPIRATION RATE: 14 BRPM | HEIGHT: 70 IN

## 2024-01-26 DIAGNOSIS — G89.18 ACUTE POSTOPERATIVE PAIN: ICD-10-CM

## 2024-01-26 DIAGNOSIS — E78.2 MIXED HYPERLIPIDEMIA: ICD-10-CM

## 2024-01-26 DIAGNOSIS — Z41.9 SURGERY, ELECTIVE: ICD-10-CM

## 2024-01-26 DIAGNOSIS — H90.3 SENSORINEURAL HEARING LOSS (SNHL) OF BOTH EARS: Primary | ICD-10-CM

## 2024-01-26 PROCEDURE — 2780000003 HC OR 278 NO HCPCS: Performed by: OTOLARYNGOLOGY

## 2024-01-26 PROCEDURE — 3600000009 HC OR TIME - EACH INCREMENTAL 1 MINUTE - PROCEDURE LEVEL FOUR: Performed by: OTOLARYNGOLOGY

## 2024-01-26 PROCEDURE — 7100000001 HC RECOVERY ROOM TIME - INITIAL BASE CHARGE: Performed by: OTOLARYNGOLOGY

## 2024-01-26 PROCEDURE — 3700000002 HC GENERAL ANESTHESIA TIME - EACH INCREMENTAL 1 MINUTE: Performed by: OTOLARYNGOLOGY

## 2024-01-26 PROCEDURE — 3700000001 HC GENERAL ANESTHESIA TIME - INITIAL BASE CHARGE: Performed by: OTOLARYNGOLOGY

## 2024-01-26 PROCEDURE — L8614 COCHLEAR DEVICE: HCPCS | Performed by: OTOLARYNGOLOGY

## 2024-01-26 PROCEDURE — A4217 STERILE WATER/SALINE, 500 ML: HCPCS | Performed by: OTOLARYNGOLOGY

## 2024-01-26 PROCEDURE — 2500000004 HC RX 250 GENERAL PHARMACY W/ HCPCS (ALT 636 FOR OP/ED): Performed by: NURSE ANESTHETIST, CERTIFIED REGISTERED

## 2024-01-26 PROCEDURE — 7100000002 HC RECOVERY ROOM TIME - EACH INCREMENTAL 1 MINUTE: Performed by: OTOLARYNGOLOGY

## 2024-01-26 PROCEDURE — 2500000004 HC RX 250 GENERAL PHARMACY W/ HCPCS (ALT 636 FOR OP/ED): Performed by: OTOLARYNGOLOGY

## 2024-01-26 PROCEDURE — 2500000004 HC RX 250 GENERAL PHARMACY W/ HCPCS (ALT 636 FOR OP/ED): Performed by: ANESTHESIOLOGY

## 2024-01-26 PROCEDURE — 2500000001 HC RX 250 WO HCPCS SELF ADMINISTERED DRUGS (ALT 637 FOR MEDICARE OP): Performed by: OTOLARYNGOLOGY

## 2024-01-26 PROCEDURE — 2500000005 HC RX 250 GENERAL PHARMACY W/O HCPCS: Performed by: OTOLARYNGOLOGY

## 2024-01-26 PROCEDURE — 2500000002 HC RX 250 W HCPCS SELF ADMINISTERED DRUGS (ALT 637 FOR MEDICARE OP, ALT 636 FOR OP/ED): Performed by: ANESTHESIOLOGY

## 2024-01-26 PROCEDURE — 2720000007 HC OR 272 NO HCPCS: Performed by: OTOLARYNGOLOGY

## 2024-01-26 PROCEDURE — A69930 PR IMPLANT COCHLEAR DEVICE: Performed by: NURSE ANESTHETIST, CERTIFIED REGISTERED

## 2024-01-26 PROCEDURE — 2500000005 HC RX 250 GENERAL PHARMACY W/O HCPCS: Performed by: NURSE ANESTHETIST, CERTIFIED REGISTERED

## 2024-01-26 PROCEDURE — 7100000009 HC PHASE TWO TIME - INITIAL BASE CHARGE: Performed by: OTOLARYNGOLOGY

## 2024-01-26 PROCEDURE — 69930 IMPLANT COCHLEAR DEVICE: CPT | Performed by: OTOLARYNGOLOGY

## 2024-01-26 PROCEDURE — 7100000010 HC PHASE TWO TIME - EACH INCREMENTAL 1 MINUTE: Performed by: OTOLARYNGOLOGY

## 2024-01-26 PROCEDURE — 3600000004 HC OR TIME - INITIAL BASE CHARGE - PROCEDURE LEVEL FOUR: Performed by: OTOLARYNGOLOGY

## 2024-01-26 PROCEDURE — 99100 ANES PT EXTEME AGE<1 YR&>70: CPT | Performed by: ANESTHESIOLOGY

## 2024-01-26 PROCEDURE — A69930 PR IMPLANT COCHLEAR DEVICE: Performed by: ANESTHESIOLOGY

## 2024-01-26 DEVICE — PROCESSOR KIT, SINGLE N8: Type: IMPLANTABLE DEVICE | Site: EAR | Status: NON-FUNCTIONAL

## 2024-01-26 DEVICE — IMPLANTABLE DEVICE
Type: IMPLANTABLE DEVICE | Site: EAR | Status: FUNCTIONAL
Brand: COCHLEAR™ NUCLEUS® CI622 COCHLEAR IMPLANT WITH SLIM STRAIGHT ELECTRODE

## 2024-01-26 RX ORDER — SODIUM CHLORIDE, SODIUM LACTATE, POTASSIUM CHLORIDE, CALCIUM CHLORIDE 600; 310; 30; 20 MG/100ML; MG/100ML; MG/100ML; MG/100ML
100 INJECTION, SOLUTION INTRAVENOUS CONTINUOUS
Status: DISCONTINUED | OUTPATIENT
Start: 2024-01-26 | End: 2024-01-26 | Stop reason: HOSPADM

## 2024-01-26 RX ORDER — OXYCODONE HYDROCHLORIDE 5 MG/1
5 TABLET ORAL EVERY 4 HOURS PRN
Status: DISCONTINUED | OUTPATIENT
Start: 2024-01-26 | End: 2024-01-26 | Stop reason: HOSPADM

## 2024-01-26 RX ORDER — FENTANYL CITRATE 50 UG/ML
INJECTION, SOLUTION INTRAMUSCULAR; INTRAVENOUS AS NEEDED
Status: DISCONTINUED | OUTPATIENT
Start: 2024-01-26 | End: 2024-01-26

## 2024-01-26 RX ORDER — SODIUM CHLORIDE 0.9 G/100ML
IRRIGANT IRRIGATION AS NEEDED
Status: DISCONTINUED | OUTPATIENT
Start: 2024-01-26 | End: 2024-01-26 | Stop reason: HOSPADM

## 2024-01-26 RX ORDER — ONDANSETRON HYDROCHLORIDE 2 MG/ML
INJECTION, SOLUTION INTRAVENOUS AS NEEDED
Status: DISCONTINUED | OUTPATIENT
Start: 2024-01-26 | End: 2024-01-26

## 2024-01-26 RX ORDER — TRAMADOL HYDROCHLORIDE 50 MG/1
50 TABLET ORAL EVERY 8 HOURS PRN
Qty: 10 TABLET | Refills: 0 | Status: SHIPPED | OUTPATIENT
Start: 2024-01-26 | End: 2024-02-03 | Stop reason: HOSPADM

## 2024-01-26 RX ORDER — APREPITANT 40 MG/1
40 CAPSULE ORAL DAILY
Status: DISCONTINUED | OUTPATIENT
Start: 2024-01-26 | End: 2024-01-26 | Stop reason: HOSPADM

## 2024-01-26 RX ORDER — FENTANYL CITRATE 50 UG/ML
25 INJECTION, SOLUTION INTRAMUSCULAR; INTRAVENOUS EVERY 5 MIN PRN
Status: DISCONTINUED | OUTPATIENT
Start: 2024-01-26 | End: 2024-01-26 | Stop reason: HOSPADM

## 2024-01-26 RX ORDER — CEFAZOLIN 1 G/1
INJECTION, POWDER, FOR SOLUTION INTRAVENOUS AS NEEDED
Status: DISCONTINUED | OUTPATIENT
Start: 2024-01-26 | End: 2024-01-26

## 2024-01-26 RX ORDER — DEXAMETHASONE SODIUM PHOSPHATE 100 MG/10ML
INJECTION INTRAMUSCULAR; INTRAVENOUS AS NEEDED
Status: DISCONTINUED | OUTPATIENT
Start: 2024-01-26 | End: 2024-01-26

## 2024-01-26 RX ORDER — NORETHINDRONE AND ETHINYL ESTRADIOL 0.5-0.035
KIT ORAL AS NEEDED
Status: DISCONTINUED | OUTPATIENT
Start: 2024-01-26 | End: 2024-01-26

## 2024-01-26 RX ORDER — GLYCOPYRROLATE 0.2 MG/ML
INJECTION INTRAMUSCULAR; INTRAVENOUS AS NEEDED
Status: DISCONTINUED | OUTPATIENT
Start: 2024-01-26 | End: 2024-01-26

## 2024-01-26 RX ORDER — OXYCODONE HYDROCHLORIDE 10 MG/1
10 TABLET ORAL EVERY 4 HOURS PRN
Status: DISCONTINUED | OUTPATIENT
Start: 2024-01-26 | End: 2024-01-26 | Stop reason: HOSPADM

## 2024-01-26 RX ORDER — BACITRACIN ZINC 500 UNIT/G
OINTMENT IN PACKET (EA) TOPICAL AS NEEDED
Status: DISCONTINUED | OUTPATIENT
Start: 2024-01-26 | End: 2024-01-26 | Stop reason: HOSPADM

## 2024-01-26 RX ORDER — PROPOFOL 10 MG/ML
INJECTION, EMULSION INTRAVENOUS AS NEEDED
Status: DISCONTINUED | OUTPATIENT
Start: 2024-01-26 | End: 2024-01-26

## 2024-01-26 RX ORDER — CEPHALEXIN 500 MG/1
500 CAPSULE ORAL 3 TIMES DAILY
Qty: 21 CAPSULE | Refills: 0 | Status: SHIPPED | OUTPATIENT
Start: 2024-01-26 | End: 2024-02-03 | Stop reason: HOSPADM

## 2024-01-26 RX ORDER — FENTANYL CITRATE 50 UG/ML
50 INJECTION, SOLUTION INTRAMUSCULAR; INTRAVENOUS EVERY 5 MIN PRN
Status: DISCONTINUED | OUTPATIENT
Start: 2024-01-26 | End: 2024-01-26 | Stop reason: HOSPADM

## 2024-01-26 RX ORDER — ACETAMINOPHEN 325 MG/1
650 TABLET ORAL EVERY 4 HOURS PRN
Status: DISCONTINUED | OUTPATIENT
Start: 2024-01-26 | End: 2024-01-26 | Stop reason: HOSPADM

## 2024-01-26 RX ORDER — LIDOCAINE HYDROCHLORIDE AND EPINEPHRINE 10; 10 MG/ML; UG/ML
INJECTION, SOLUTION INFILTRATION; PERINEURAL AS NEEDED
Status: DISCONTINUED | OUTPATIENT
Start: 2024-01-26 | End: 2024-01-26 | Stop reason: HOSPADM

## 2024-01-26 RX ORDER — ROCURONIUM BROMIDE 10 MG/ML
INJECTION, SOLUTION INTRAVENOUS AS NEEDED
Status: DISCONTINUED | OUTPATIENT
Start: 2024-01-26 | End: 2024-01-26

## 2024-01-26 RX ORDER — LIDOCAINE HYDROCHLORIDE 20 MG/ML
INJECTION, SOLUTION INFILTRATION; PERINEURAL AS NEEDED
Status: DISCONTINUED | OUTPATIENT
Start: 2024-01-26 | End: 2024-01-26

## 2024-01-26 RX ADMIN — SODIUM CHLORIDE, SODIUM LACTATE, POTASSIUM CHLORIDE, AND CALCIUM CHLORIDE: .6; .31; .03; .02 INJECTION, SOLUTION INTRAVENOUS at 07:28

## 2024-01-26 RX ADMIN — PROPOFOL 150 MG: 10 INJECTION, EMULSION INTRAVENOUS at 07:40

## 2024-01-26 RX ADMIN — EPHEDRINE SULFATE 10 MG: 50 INJECTION, SOLUTION INTRAVENOUS at 07:57

## 2024-01-26 RX ADMIN — APREPITANT 40 MG: 40 CAPSULE ORAL at 07:00

## 2024-01-26 RX ADMIN — PROPOFOL 50 MG: 10 INJECTION, EMULSION INTRAVENOUS at 07:46

## 2024-01-26 RX ADMIN — GLYCOPYRROLATE 0.2 MG: 0.2 INJECTION INTRAMUSCULAR; INTRAVENOUS at 07:39

## 2024-01-26 RX ADMIN — FENTANYL CITRATE 50 MCG: 50 INJECTION, SOLUTION INTRAMUSCULAR; INTRAVENOUS at 07:40

## 2024-01-26 RX ADMIN — LIDOCAINE HYDROCHLORIDE 100 MG: 20 INJECTION, SOLUTION INFILTRATION; PERINEURAL at 07:40

## 2024-01-26 RX ADMIN — SODIUM CHLORIDE 0.05 MCG/KG/MIN: 9 INJECTION, SOLUTION INTRAVENOUS at 08:10

## 2024-01-26 RX ADMIN — SODIUM CHLORIDE, SODIUM LACTATE, POTASSIUM CHLORIDE, AND CALCIUM CHLORIDE 100 ML/HR: .6; .31; .03; .02 INJECTION, SOLUTION INTRAVENOUS at 07:15

## 2024-01-26 RX ADMIN — ACETAMINOPHEN 650 MG: 325 TABLET ORAL at 10:30

## 2024-01-26 RX ADMIN — DEXAMETHASONE SODIUM PHOSPHATE 10 MG: 10 INJECTION INTRAMUSCULAR; INTRAVENOUS at 08:05

## 2024-01-26 RX ADMIN — EPHEDRINE SULFATE 10 MG: 50 INJECTION, SOLUTION INTRAVENOUS at 09:06

## 2024-01-26 RX ADMIN — SUGAMMADEX 200 MG: 100 INJECTION, SOLUTION INTRAVENOUS at 09:53

## 2024-01-26 RX ADMIN — SODIUM CHLORIDE, POTASSIUM CHLORIDE, SODIUM LACTATE AND CALCIUM CHLORIDE 100 ML/HR: 600; 310; 30; 20 INJECTION, SOLUTION INTRAVENOUS at 10:01

## 2024-01-26 RX ADMIN — ONDANSETRON 4 MG: 2 INJECTION INTRAMUSCULAR; INTRAVENOUS at 09:26

## 2024-01-26 RX ADMIN — CEFAZOLIN 2 G: 1 INJECTION, POWDER, FOR SOLUTION INTRAMUSCULAR; INTRAVENOUS at 07:50

## 2024-01-26 RX ADMIN — ROCURONIUM BROMIDE 50 MG: 50 INJECTION INTRAVENOUS at 07:41

## 2024-01-26 SDOH — HEALTH STABILITY: MENTAL HEALTH: CURRENT SMOKER: 0

## 2024-01-26 ASSESSMENT — PAIN SCALES - GENERAL
PAINLEVEL_OUTOF10: 0 - NO PAIN
PAIN_LEVEL: 0
PAINLEVEL_OUTOF10: 3
PAINLEVEL_OUTOF10: 0 - NO PAIN

## 2024-01-26 ASSESSMENT — PAIN - FUNCTIONAL ASSESSMENT
PAIN_FUNCTIONAL_ASSESSMENT: 0-10

## 2024-01-26 ASSESSMENT — COLUMBIA-SUICIDE SEVERITY RATING SCALE - C-SSRS
1. IN THE PAST MONTH, HAVE YOU WISHED YOU WERE DEAD OR WISHED YOU COULD GO TO SLEEP AND NOT WAKE UP?: NO
6. HAVE YOU EVER DONE ANYTHING, STARTED TO DO ANYTHING, OR PREPARED TO DO ANYTHING TO END YOUR LIFE?: NO
2. HAVE YOU ACTUALLY HAD ANY THOUGHTS OF KILLING YOURSELF?: NO

## 2024-01-26 NOTE — OP NOTE
OPERATIVE NOTE     Date:  2024 [place] [facility] [place] [facility] [place]    Name: Neymar Hannon : 1945, Age: 78 y.o., MRN: [id number], Sex: male      Surgeons      Mary Kay Boudreaux MD    Resident/Fellow/Other Assistant:  Elvia Weaver MD    Anesthesia: General  ASA: II  Anesthesia Staff: Anesthesiologist: Cynthia Bae MD  CRNA: GERALDO Wolf-CRNA  Staff: Circulator: Eugenia Becerra RN; Mir Culver RN  Scrub Person: Mariam Logan         Preoperative Diagnosis:  1. Sensorineural Hearing Loss   - Bilateral.    Postoperative Diagnosis:  1. Sensorineural Hearing Loss   - Bilateral.      Procedure Performed:  1.  Cochlear Implantation (27950)   - Right  2. Needle electromyography; cranial nerve supplied muscle(s), unilateral   - Facial nerve.   - Right  3. Microsurgical techniques, requiring use of operating microscope   - Right      Indications:  Neymar Hannon is a very pleasant 78 y.o. male who presents with Bilateral severe-to-profound, sensorineural hearing loss and poor discrimination. The patient meets criteria for FDA insertion of cochlear implant. The risks, indications, and complications of surgery were discussed including, but not limited to facial nerve injury, deafness in the operated ear, vertigo, dizziness, imbalance, facial weakness or paralysis, change in sense of taste, perforation of eardrum, pain, bleeding, infection, scarring, need for further surgery, device failure, device extrusion, spinal fluid leak, meningitis, brain damage, brain abscess, stroke, and death. Informed consent was obtained. We also confirmed the patient received the Pneumococcus vaccine prior to surgery. Because of the extensive nature of the dissection and the close proximity of the facial nerve, facial nerve monitoring was used throughout the case.       Operative Findings:  1. Well aerated mastoid.  2.  stimulator position: under temporalis muscle.  2. Facial nerve intact in normal position.  3. Chorda tympani:  intact.   4. Approach: Round window  5. Implant/ electrode:  Cochlear 622  placed.  6. Favorable anatomy.   7. Insertion:   - Very smooth full insertion.  - Speed:  90-120sec.  - Marker: outside RW.   - No resistance.  8. Neural response telemetry: good response.  9. Intraop testing:   - X-ray: Not Performed.   - SmartNav position check: Normal.   - ECOG: Not performed.    - eSRT: Performed.      Operative Technique:   After informed consent was obtained, the patient was taken to the operating room and placed on the operating room table in the supine position. Anesthesia was induced by the anesthesia team without difficulty. Facial nerve monitoring electrodes were placed in the orbicularis oris muscle and orbicularis oculi muscle and the monitor was activated. Lidocaine with epinephrine was injected in the postauricular area of the incision and the patient was then prepped and draped in standard sterile fashion.    The postauricular incision was made down through the skin and soft tissue to the temporalis muscle and the ear was reflected forward in its avascular plane. Bovie cautery was then used to make a cut through musculofascial layers along the linea temporalis and this then was dissected down to the mastoid tip. Lempert periosteal elevator was used to elevate the soft tissues anteriorly, superiorly, and posteriorly, thus exposing the mastoid and the opening of the external auditory canal. We then used the Lempert elevator to elevate the periosteum off of the skull anteriorly for the ground electrode. The Lempert was then used posterior and superior to the mastoid to develop a tight pocket. Muscle from the temporalis muscle was then harvested for use later in the procedure. Dura hooks where used to hold our view.    A standard mastoidectomy was then completed, carefully identifying the tegmen, sinodural angle and posterior canal wall. We identify the lateral semicircular canal and short process of the incus.  Using 3-brit taylor, and progressing down to a 2-brit taylor we opened the facial recess. We carefully identified the chorda tympani, the incus buttress, and the facial nerve. The facial recess was opened exposing the round window and the rest of the middle ear. The mucosa over the round window niche was elevated using the right angle. The round window niche was identified and its lip was drilled to have a 360-degree view of it.  The postion of the RW was unfavorable given the anterior position of the facial nerve. Therefore I elected to use the 622 electrode.  .    A 2 mm cutting drill was used to created holes at the lateral edge of the mastoidectomy to secure the internal  in place. The ear was copiously irrigated with saline.  The cochlear implant was then brought onto the operative field and placed in our subperiosteal pocket. The cochlear implant was then secured in its position with Vycril 3-0, tagging the opening of the subperiosteal pocket down to bone.    Copious irrigation was then performed  At this point, we performed the opening of the round window with a small right angle. Using microsuction and the jeweler's forceps, we inserted the cochlear implant electrode without resistance using  soft insertion  technique. The electrode was advanced slowly to the marker on the array. The insertion was very smooth. A small washer of fascia was harvested from the temporalis and a hole made in the fascia to slide over the eletrode for sealing the RW.    The wound was then closed in 3 layers using a 3-0 Vicryl for the muscle, 4-0 Vicryl for the subcuticular stitches and 5-0 fast gut suture for the dermis. During this time, the audiologist was here to induct neural response telemetry of the cochlear implant and got good responses. eSRT and SmartNav was performed. At the conclusion of this procedure the incision was covered with a sterile dressing and the draping was removed. A Colleton dressing was applied  to the patient's ear. The patient's care was returned to anesthesia for awakening.    This completed the procedure. The patient was then emerged from anesthesia and extubated without difficulty. The patient was then transported back to PACU. There were no apparent complications. Following the procedure, the findings were discussed with the patient's family and all of their questions were answered.    Attending Attestation: I was present for the entire procedure.      Implants:   Implant Name Type Inv. Item Serial No.  Lot No. LRB No. Used Action   COCHLEAR, NUCLEUS , PROFILE PLUS W/SLIM STRAIGHT ELECTRODE - [id number] - [id number] ENT Implant COCHLEAR, NUCLEUS , PROFILE PLUS W/SLIM STRAIGHT ELECTRODE [id number] COCHLEAR [facility] [id number] Right 1 Implanted     Specimens: No specimens collected  Estimated Blood Loss:  5ml  Complications: none  Condition of the patient: Stable  Disposition: PACU    ____________________________________________________  Mary Kay Boudreaux MD  Professor  Otology/Neurotology/Lateral Skull-Base Surgery   [facility]  Phone: [phone]  Fax: [phone]

## 2024-01-26 NOTE — ANESTHESIA PREPROCEDURE EVALUATION
Patient: Neymar Hannon    Procedure Information       Anesthesia Start Date/Time: 01/26/24 0734    Procedure: RIGHT SIDE COCHLEAR IMPLANT NUCLEUS 632/622 AS BACK UP SINGLE PROCESSOR (Right) - please book for 2.5 hour    [place] [facility] [place] [facility] [place] [facility] [place] [facility] [place]    Surgeons: Mary Kay Boudreaux MD          Vitals:    01/26/24 0721   BP: (!) 184/75   Pulse: (!) 49   Resp: 16   Temp: 36.4 °C (97.5 °F)   SpO2: 99%       Past Surgical History:   Procedure Laterality Date   • ADENOIDECTOMY     • COLONOSCOPY     • OTHER SURGICAL HISTORY Left 12/10/2019    shoulder arthroplasty   • TONSILLECTOMY       Past Medical History:   Diagnosis Date   • Acid reflux    • Hyperlipidemia    • Other specified health status     No pertinent past medical history       Current Facility-Administered Medications:   •  aprepitant (Emend) capsule 40 mg, 40 mg, oral, Daily, Cynthia Bae MD, 40 mg at 01/26/24 0700  •  lactated Ringer's infusion, 100 mL/hr, intravenous, Continuous, Vida Garsia MD, Last Rate: 100 mL/hr at 01/26/24 0734, Continued by Anesthesia at 01/26/24 0734  Prior to Admission medications    Medication Sig Start Date End Date Taking? Authorizing Provider   esomeprazole (NexIUM) 20 mg DR capsule Take 1 capsule (20 mg) by mouth once daily. 10/30/19  Yes Historical Provider, MD   lisinopril 40 mg tablet Take 1 tablet (40 mg) by mouth once daily.   Yes Historical Provider, MD   simvastatin (Zocor) 40 mg tablet Take 1 tablet (40 mg) by mouth. 10/27/15  Yes Historical Provider, MD   cephalexin (Keflex) 500 mg capsule Take 1 capsule (500 mg) by mouth 3 times a day for 7 days. 1/26/24 2/2/24  Elvia Weaver MD   traMADol (Ultram) 50 mg tablet Take 1 tablet (50 mg) by mouth every 8 hours if needed for severe pain (7 - 10). 1/26/24   Elvia Weaver MD     Allergies   Allergen Reactions   • Amlodipine-Olmesartan Rash     Social History     Tobacco Use   • Smoking status: Never   • Smokeless tobacco: Never   Substance Use Topics  "  • Alcohol use: Yes     Comment: 0-2 drinks every 2 weeks         Chemistry    Lab Results   Component Value Date/Time     01/10/2024 1609    K 4.3 01/10/2024 1609     01/10/2024 1609    CO2 27 01/10/2024 1609    BUN 21 01/10/2024 1609    CREATININE 1.34 (H) 01/10/2024 1609    Lab Results   Component Value Date/Time    CALCIUM 9.5 01/10/2024 1609          Lab Results   Component Value Date/Time    WBC 8.9 01/10/2024 1609    HGB 13.3 (L) 01/10/2024 1609    HCT 42.0 01/10/2024 1609     01/10/2024 1609     No results found for: \"PROTIME\", \"PTT\", \"INR\"  No results found for this or any previous visit (from the past 4464 hour(s)).  No results found for this or any previous visit from the past 1095 days.        Relevant Problems   Eyes, Ears, Nose, and Throat   (+) Sensorineural hearing loss (SNHL) of both ears      Other   (+) Shoulder arthritis       Clinical information reviewed:   Tobacco  Allergies  Meds   Med Hx  Surg Hx   Fam Hx  Soc Hx        NPO Detail:  NPO/Void Status  Carbohydrate Drink Given Prior to Surgery? : N  Date of Last Liquid: 01/25/24  Time of Last Liquid: 2000  Date of Last Solid: 01/25/24  Time of Last Solid: 2000  Last Intake Type: Solid meal         Physical Exam    Airway  Mallampati: IV  TM distance: >3 FB  Neck ROM: full     Cardiovascular   Rhythm: regular  Rate: abnormal     Dental - normal exam     Pulmonary    Abdominal      Other findings: Bradycardic in high 40's on DOS.  Asymptomatic. Patient states that is a normal heart rate for him (his watch frequently picks up on the low HR). Frequently exercises.  Sinus bradycardia with 1st degree AV block on previous EKG (2022).        Anesthesia Plan    History of general anesthesia?: yes  History of complications of general anesthesia?: no    ASA 2     general     The patient is not a current smoker.    intravenous induction   Anesthetic plan and risks discussed with patient.    Plan discussed with CRNA.    "

## 2024-01-26 NOTE — PROGRESS NOTES
Cochlear Implant Intra-Operative Monitoring     Date: 1/26/2024  Surgeon: Dr. Mary Kay Boudreaux MD  Audiologist / Audiology Extern: PATTY Bloom      Implanted Ear: Right  : [employer]chle[employer]  Implant:   Serial Number: [id number]      Electrode insertion achieved: Full    The following electrodes were noted to be outside the cochlea: 0      Impedances:  Impedances were measured on electrodes 1-22 .    aNRT/ NRI:  Positive neural response on electrodes 1-22.    ESRT: ESRTs were obtained using a pulse width of 50 and a stimulation rate of 900 Hz.  Responses were obtained at electrodes 16,12,17,22.    ECoG: Intracochlear electrocochleography was not clinically indicated.           Equipment and Forms:   The patient's equipment backpack was provided to the family. They were counseled to bring the backpack to activation. Post-operative course was reviewed with the family. The patient is instructed to stop using their hearing aid on the newly implanted ear. They are encouraged to continue using their hearing aid on the non-implanted ear.  The family was provided with the internal implant guide, MRI compatibility booklet, and implant identification card specific to the patient's implant. The patient should place this in their wallet. The family was counseled that the internal implant was registered; indicating the start of the 10 year 's warranty. The external equipment, located in the backpack provided, will be registered at the activation; indicating the start of the 5 year 's warranty on the processor.  The activation follow up schedule was reviewed and a completed form with the appointments was provided. The auditory verbal therapy (AVT) form was provided to the family. The patient is guided to schedule AVT between the 2nd and 3rd activation by the Cochlear Implant team.  Post operative questions should be guided to the Patient Navigator Cochlear Implant Program, Sheba Noel,  RN at [phone].     Appointments  The patient is scheduled for follow up with Nba Jackson, CCC-A at the [place] location.    Activation:   Date/Time : 02/20/2024, 10:15    2nd Stimulation:  Date/Time: 03/07/2024, 1:00    3rd Stimulation:  Date/Time: 04/03/2024, 1:00      Intraoperative testing was completed by PATTY Bloom under the supervision of Nba Heck, PhD, CCC-A.

## 2024-01-26 NOTE — ANESTHESIA PROCEDURE NOTES
Airway  Date/Time: 1/26/2024 7:44 AM  Urgency: elective    Airway not difficult    Staffing  Performed: CRNA   Authorized by: Cynthia Bae MD    Performed by: GERALDO Wolf-PAULINE  Patient location during procedure: OR    Indications and Patient Condition  Indications for airway management: anesthesia  Spontaneous Ventilation: absent  Sedation level: deep  Preoxygenated: yes  Patient position: sniffing  Mask difficulty assessment: 1 - vent by mask    Final Airway Details  Final airway type: endotracheal airway      Successful airway: ETT  Cuffed: yes   Successful intubation technique: video laryngoscopy  Endotracheal tube insertion site: oral  Blade: Aidan  Blade size: #4  ETT size (mm): 7.5  Cormack-Lehane Classification: grade I - full view of glottis  Placement verified by: chest auscultation and capnometry   Measured from: lips  ETT to lips (cm): 23  Number of attempts at approach: 1  Ventilation between attempts: BVM  Number of other approaches attempted: 0

## 2024-01-26 NOTE — H&P
History Of Present Illness:  Neymar Hannon is a 78 y.o. male with a history of bilateral sensorineural hearing loss and bilateral tinnitus, she was referred here today by Akosua Rodgers CNP, for cochlear implant consultation. Patient has undergone audiometric evaluation and meets criteria in both ears, per CMS. He has done research on the implant and has a general idea of the procedure itself and the expected postoperative course. He denies any otalgia, otorrhea, vertigo, or other otologic findings. There are no other contributory factors. He has a history of hypertension and hyperlipidemia. .     Past Medical History  He has a past medical history of Acid reflux, Hyperlipidemia, and Other specified health status.    Surgical History  He has a past surgical history that includes Other surgical history (Left, 12/10/2019); Tonsillectomy; Adenoidectomy; and Colonoscopy.     Social History  He reports that he has never smoked. He has never used smokeless tobacco. He reports current alcohol use. He reports that he does not use drugs.    Family History  Family History   Problem Relation Name Age of Onset    Other (heart problem) Other family history         Allergies  Amlodipine-olmesartan    Review of Systems     Physical Exam     Last Recorded Vitals  There were no vitals taken for this visit.    Relevant Results            Physical Exam:  Constitutional   General appearance: Healthy-appearing, well-nourished, well groomed, in no acute distress.   Ability to communicate: Normal communication without aids, normal voice quality.       Head and face: Atraumatic with no masses, lesions, or scarring.   Facial strength: Normal strength and symmetry, no synkinesis or facial tic.      Ears  Otoscopic examination: Bilateral normal otoscopy of the tympanic membrane     Nose: Dorsum symmetric with no visible or palpable deformities.     Oral Cavity/Mouth  Lips, teeth, and gums: Normal lips, gums, and dentition.     Oropharynx:  Mucosa moist, no lesions.     Neck: Symmetrical, trachea midline. No masses visible.     Neurological/Psychiatric  Cranial Nerve Examination: II - XII grossly intact.  Orientation to person, place, and time: Normal.  Mood and affect: Normal.     Skin: Normal without rashes or lesions.     Pulmonary  Respiratory effort: Chest expands symmetrically.     Cardiovascular: Good peripheral pulses  Peripheral vascular system: No varicosities, carotid pulse normal, no edema. No jugular venous distension.     Extremities: Appearance of extremities: Normal. Gait normal.      Assessment/Plan   Principal Problem:    Sensorineural hearing loss (SNHL) of both ears     Plan: Right CI     Elvia Weaver MD

## 2024-01-26 NOTE — ANESTHESIA POSTPROCEDURE EVALUATION
Patient: Neymar Hannon    Procedure Summary       Date: 01/26/24 Room / Location: [facility] OR 01 / [facility] [place] [facility] [place]    Anesthesia Start: 0734 Anesthesia Stop: 1008    Procedure: RIGHT SIDE COCHLEAR IMPLANT NUCLEUS 632/622 AS BACK UP SINGLE PROCESSOR (Right: Ear) Diagnosis:       Sensorineural hearing loss (SNHL) of both ears      (Sensorineural hearing loss (SNHL) of both ears [H90.3])    Surgeons: Mary Kay Boudreaux MD Responsible Provider: Cynthia Bae MD    Anesthesia Type: general ASA Status: 2            Anesthesia Type: general    Vitals Value Taken Time   /76 01/26/24 1008   Temp 36.0 01/26/24 1008   Pulse 79 01/26/24 1008   Resp 16 01/26/24 1008   SpO2 100 01/26/24 1008       Anesthesia Post Evaluation    Patient location during evaluation: PACU  Patient participation: complete - patient participated  Level of consciousness: awake  Pain score: 0  Pain management: adequate  Airway patency: patent  Two or more strategies used to mitigate risk of obstructive sleep apnea  Cardiovascular status: acceptable  Respiratory status: acceptable  Hydration status: acceptable  Postoperative Nausea and Vomiting: none      There were no known notable events for this encounter.

## 2024-01-26 NOTE — DISCHARGE INSTRUCTIONS
Most ear surgeries should have a 2-4 week postoperative appointment. Please be sure to call the doctor's office and make a follow-up appointment, if you don't already have it.  Dressing or Band-Aid can be removed the day after surgery.  Once removed, replace the cotton ball in the ear as needed.  Once the dressing is off, and if you have an incision behind your ear with stitches, clean the incision twice daily with soap and water and apply Vaseline or antibiotic ointment after cleaning. If you have paper strips or surgical glue over the incision, Do not apply anything behind the ear.  Bloody drainage from the ear is common.  Call the office if discharge from the ear last longer than 21 days or develops an odor or color.  You may shower the day after surgery, if you keep your head dry.  The hair may be shampooed 2 days following surgery.  You may get water on incision or in ear canal, but do not actively scrub the incision.  Bloody discharge from incision area may occur during the first 10 days following surgery.  If this persists or increases, please call the office.  A full sensation with popping sounds may be noticed during the healing process.  DO NOT BLOW YOUR NOSE FOR THREE WEEKS FOLLOWING SURGERY. If you sneeze, do so with your mouth open for three weeks following surgery. Do not use a straw to drink beverages for 3 weeks following surgery.  Do not use Q-Tips or put anything in the canal, until approved by your doctor.  Do not be concerned regarding your hearing for a period of six to eight weeks following surgery.  Your hearing will be evaluated at this time; until then, your hearing may sound muffled and your voice may echo in your ear during speech.  Minor swelling of the face on the same side of the surgery is not uncommon.  Small bruising near the eye or mouth is not uncommon from the facial nerve monitor.  Dizziness, ringing in the ear, and taste disturbance after surgery are common. Call if  severe.   You might notice pain when chewing, please use soft diet for 2 weeks if you experience this.  No lifting (more than 10 lbs) or straining until follow up.  You will be discharged on pain medications and possibly antibiotics.  You may resume your routine medications as directed, unless you have been instructed otherwise by the prescribing healthcare provider.  Should you experience any difficulty upon returning home, or if you simply have questions, please contact us.  As your surgeons, we are most familiar with your operation and postoperative procedures.  We are accessible by telephone 24 hours a day, 7 days a week.  Once we have assessed your situation, we will be prepared to make specific suggestions for your care.

## 2024-01-29 ENCOUNTER — TELEPHONE (OUTPATIENT)
Dept: OTOLARYNGOLOGY | Facility: CLINIC | Age: 79
End: 2024-01-29
Payer: MEDICARE

## 2024-01-29 ASSESSMENT — PAIN SCALES - GENERAL: PAINLEVEL_OUTOF10: 1

## 2024-01-29 NOTE — TELEPHONE ENCOUNTER
Patient had surgery with Dr. Boudreaux 1/26/24 and had post operative questions. He states that since surgery, his BP at home has been elevated more than usual. It was as high as the 170s/90s and most recently today it was 150/90. He currently takes lisinopril for BP management. I did remind him that pain can increase BP and he states that his pain is well managed with tylenol as well as ibuprofen. He is not taking tramadol. I advised that he should follow with his PCP regarding this issue. His PCP requested that the surgeon also be notified.

## 2024-02-01 ENCOUNTER — APPOINTMENT (OUTPATIENT)
Dept: RADIOLOGY | Facility: HOSPITAL | Age: 79
End: 2024-02-01
Payer: MEDICARE

## 2024-02-01 ENCOUNTER — APPOINTMENT (OUTPATIENT)
Dept: CARDIOLOGY | Facility: HOSPITAL | Age: 79
End: 2024-02-01
Payer: MEDICARE

## 2024-02-01 ENCOUNTER — HOSPITAL ENCOUNTER (OUTPATIENT)
Facility: HOSPITAL | Age: 79
Setting detail: OBSERVATION
Discharge: HOME | End: 2024-02-03
Attending: EMERGENCY MEDICINE | Admitting: INTERNAL MEDICINE
Payer: MEDICARE

## 2024-02-01 DIAGNOSIS — R42 VERTIGO: Primary | ICD-10-CM

## 2024-02-01 LAB
ALBUMIN SERPL BCP-MCNC: 3.5 G/DL (ref 3.4–5)
ALP SERPL-CCNC: 61 U/L (ref 33–136)
ALT SERPL W P-5'-P-CCNC: 21 U/L (ref 10–52)
ANION GAP SERPL CALC-SCNC: 12 MMOL/L
AST SERPL W P-5'-P-CCNC: 20 U/L (ref 9–39)
BASOPHILS # BLD AUTO: 0.03 X10*3/UL (ref 0–0.1)
BASOPHILS NFR BLD AUTO: 0.3 %
BILIRUB SERPL-MCNC: 0.6 MG/DL (ref 0–1.2)
BNP SERPL-MCNC: 53 PG/ML (ref 0–99)
BUN SERPL-MCNC: 20 MG/DL (ref 6–23)
CALCIUM SERPL-MCNC: 8.2 MG/DL (ref 8.6–10.3)
CARDIAC TROPONIN I PNL SERPL HS: 12 NG/L (ref 0–20)
CHLORIDE SERPL-SCNC: 106 MMOL/L (ref 98–107)
CO2 SERPL-SCNC: 24 MMOL/L (ref 21–32)
CREAT SERPL-MCNC: 1.09 MG/DL (ref 0.5–1.3)
EGFRCR SERPLBLD CKD-EPI 2021: 69 ML/MIN/1.73M*2
EOSINOPHIL # BLD AUTO: 0.17 X10*3/UL (ref 0–0.4)
EOSINOPHIL NFR BLD AUTO: 1.9 %
ERYTHROCYTE [DISTWIDTH] IN BLOOD BY AUTOMATED COUNT: 13 % (ref 11.5–14.5)
FLUAV RNA RESP QL NAA+PROBE: NOT DETECTED
FLUBV RNA RESP QL NAA+PROBE: NOT DETECTED
GLUCOSE SERPL-MCNC: 114 MG/DL (ref 74–99)
HCT VFR BLD AUTO: 37.5 % (ref 41–52)
HGB BLD-MCNC: 12 G/DL (ref 13.5–17.5)
IMM GRANULOCYTES # BLD AUTO: 0.03 X10*3/UL (ref 0–0.5)
IMM GRANULOCYTES NFR BLD AUTO: 0.3 % (ref 0–0.9)
INR PPP: 1.1 (ref 0.9–1.1)
LIPASE SERPL-CCNC: 13 U/L (ref 9–82)
LYMPHOCYTES # BLD AUTO: 1.04 X10*3/UL (ref 0.8–3)
LYMPHOCYTES NFR BLD AUTO: 11.9 %
MAGNESIUM SERPL-MCNC: 1.8 MG/DL (ref 1.6–2.4)
MCH RBC QN AUTO: 28.4 PG (ref 26–34)
MCHC RBC AUTO-ENTMCNC: 32 G/DL (ref 32–36)
MCV RBC AUTO: 89 FL (ref 80–100)
MONOCYTES # BLD AUTO: 0.5 X10*3/UL (ref 0.05–0.8)
MONOCYTES NFR BLD AUTO: 5.7 %
NEUTROPHILS # BLD AUTO: 6.96 X10*3/UL (ref 1.6–5.5)
NEUTROPHILS NFR BLD AUTO: 79.9 %
NRBC BLD-RTO: 0 /100 WBCS (ref 0–0)
PLATELET # BLD AUTO: 119 X10*3/UL (ref 150–450)
POTASSIUM SERPL-SCNC: 4.2 MMOL/L (ref 3.5–5.3)
PROT SERPL-MCNC: 6.2 G/DL (ref 6.4–8.2)
PROTHROMBIN TIME: 12 SECONDS (ref 9.8–12.8)
RBC # BLD AUTO: 4.23 X10*6/UL (ref 4.5–5.9)
SARS-COV-2 RNA RESP QL NAA+PROBE: NOT DETECTED
SODIUM SERPL-SCNC: 138 MMOL/L (ref 136–145)
WBC # BLD AUTO: 8.7 X10*3/UL (ref 4.4–11.3)

## 2024-02-01 PROCEDURE — 83735 ASSAY OF MAGNESIUM: CPT | Performed by: EMERGENCY MEDICINE

## 2024-02-01 PROCEDURE — 70498 CT ANGIOGRAPHY NECK: CPT

## 2024-02-01 PROCEDURE — 85610 PROTHROMBIN TIME: CPT | Performed by: EMERGENCY MEDICINE

## 2024-02-01 PROCEDURE — 84484 ASSAY OF TROPONIN QUANT: CPT | Performed by: EMERGENCY MEDICINE

## 2024-02-01 PROCEDURE — 99285 EMERGENCY DEPT VISIT HI MDM: CPT | Performed by: EMERGENCY MEDICINE

## 2024-02-01 PROCEDURE — 36415 COLL VENOUS BLD VENIPUNCTURE: CPT | Performed by: EMERGENCY MEDICINE

## 2024-02-01 PROCEDURE — 70498 CT ANGIOGRAPHY NECK: CPT | Performed by: RADIOLOGY

## 2024-02-01 PROCEDURE — 83880 ASSAY OF NATRIURETIC PEPTIDE: CPT | Performed by: EMERGENCY MEDICINE

## 2024-02-01 PROCEDURE — 85025 COMPLETE CBC W/AUTO DIFF WBC: CPT | Performed by: EMERGENCY MEDICINE

## 2024-02-01 PROCEDURE — 87636 SARSCOV2 & INF A&B AMP PRB: CPT | Performed by: EMERGENCY MEDICINE

## 2024-02-01 PROCEDURE — 83690 ASSAY OF LIPASE: CPT | Performed by: EMERGENCY MEDICINE

## 2024-02-01 PROCEDURE — 71045 X-RAY EXAM CHEST 1 VIEW: CPT

## 2024-02-01 PROCEDURE — 93005 ELECTROCARDIOGRAM TRACING: CPT

## 2024-02-01 PROCEDURE — 70496 CT ANGIOGRAPHY HEAD: CPT | Performed by: RADIOLOGY

## 2024-02-01 PROCEDURE — 2550000001 HC RX 255 CONTRASTS: Performed by: EMERGENCY MEDICINE

## 2024-02-01 PROCEDURE — 71045 X-RAY EXAM CHEST 1 VIEW: CPT | Performed by: RADIOLOGY

## 2024-02-01 PROCEDURE — 82435 ASSAY OF BLOOD CHLORIDE: CPT | Performed by: EMERGENCY MEDICINE

## 2024-02-01 RX ADMIN — IOHEXOL 75 ML: 350 INJECTION, SOLUTION INTRAVENOUS at 23:49

## 2024-02-01 ASSESSMENT — PAIN SCALES - GENERAL: PAINLEVEL_OUTOF10: 0 - NO PAIN

## 2024-02-02 PROBLEM — R42 VERTIGO: Status: ACTIVE | Noted: 2024-02-02

## 2024-02-02 LAB
APPEARANCE UR: CLEAR
BILIRUB UR STRIP.AUTO-MCNC: NEGATIVE MG/DL
CARDIAC TROPONIN I PNL SERPL HS: 12 NG/L (ref 0–20)
COLOR UR: YELLOW
GLUCOSE UR STRIP.AUTO-MCNC: NEGATIVE MG/DL
INR PPP: 1.2 (ref 0.9–1.1)
KETONES UR STRIP.AUTO-MCNC: NEGATIVE MG/DL
LEUKOCYTE ESTERASE UR QL STRIP.AUTO: NEGATIVE
NITRITE UR QL STRIP.AUTO: NEGATIVE
PH UR STRIP.AUTO: 7 [PH]
PROT UR STRIP.AUTO-MCNC: NEGATIVE MG/DL
PROTHROMBIN TIME: 13 SECONDS (ref 9.8–12.8)
RBC # UR STRIP.AUTO: NEGATIVE /UL
SP GR UR STRIP.AUTO: 1.03
UROBILINOGEN UR STRIP.AUTO-MCNC: <2 MG/DL

## 2024-02-02 PROCEDURE — 36415 COLL VENOUS BLD VENIPUNCTURE: CPT | Performed by: EMERGENCY MEDICINE

## 2024-02-02 PROCEDURE — G0378 HOSPITAL OBSERVATION PER HR: HCPCS

## 2024-02-02 PROCEDURE — 85610 PROTHROMBIN TIME: CPT | Performed by: EMERGENCY MEDICINE

## 2024-02-02 PROCEDURE — 84484 ASSAY OF TROPONIN QUANT: CPT | Performed by: EMERGENCY MEDICINE

## 2024-02-02 PROCEDURE — 2500000001 HC RX 250 WO HCPCS SELF ADMINISTERED DRUGS (ALT 637 FOR MEDICARE OP): Performed by: EMERGENCY MEDICINE

## 2024-02-02 PROCEDURE — 2500000004 HC RX 250 GENERAL PHARMACY W/ HCPCS (ALT 636 FOR OP/ED): Performed by: HOSPITALIST

## 2024-02-02 PROCEDURE — 96374 THER/PROPH/DIAG INJ IV PUSH: CPT

## 2024-02-02 PROCEDURE — 96376 TX/PRO/DX INJ SAME DRUG ADON: CPT

## 2024-02-02 PROCEDURE — 2500000004 HC RX 250 GENERAL PHARMACY W/ HCPCS (ALT 636 FOR OP/ED): Performed by: EMERGENCY MEDICINE

## 2024-02-02 PROCEDURE — 99222 1ST HOSP IP/OBS MODERATE 55: CPT | Performed by: HOSPITALIST

## 2024-02-02 PROCEDURE — 2500000001 HC RX 250 WO HCPCS SELF ADMINISTERED DRUGS (ALT 637 FOR MEDICARE OP): Performed by: HOSPITALIST

## 2024-02-02 PROCEDURE — 81003 URINALYSIS AUTO W/O SCOPE: CPT | Performed by: EMERGENCY MEDICINE

## 2024-02-02 RX ORDER — DEXAMETHASONE SODIUM PHOSPHATE 4 MG/ML
8 INJECTION, SOLUTION INTRA-ARTICULAR; INTRALESIONAL; INTRAMUSCULAR; INTRAVENOUS; SOFT TISSUE EVERY 8 HOURS
Status: COMPLETED | OUTPATIENT
Start: 2024-02-02 | End: 2024-02-03

## 2024-02-02 RX ORDER — PANTOPRAZOLE SODIUM 40 MG/1
40 TABLET, DELAYED RELEASE ORAL
Status: DISCONTINUED | OUTPATIENT
Start: 2024-02-02 | End: 2024-02-03 | Stop reason: HOSPADM

## 2024-02-02 RX ORDER — MECLIZINE HYDROCHLORIDE 25 MG/1
25 TABLET ORAL ONCE
Status: COMPLETED | OUTPATIENT
Start: 2024-02-02 | End: 2024-02-02

## 2024-02-02 RX ORDER — SODIUM CHLORIDE 9 MG/ML
75 INJECTION, SOLUTION INTRAVENOUS CONTINUOUS
Status: DISCONTINUED | OUTPATIENT
Start: 2024-02-02 | End: 2024-02-02

## 2024-02-02 RX ORDER — POLYETHYLENE GLYCOL 3350 17 G/17G
17 POWDER, FOR SOLUTION ORAL DAILY
Status: DISCONTINUED | OUTPATIENT
Start: 2024-02-02 | End: 2024-02-03 | Stop reason: HOSPADM

## 2024-02-02 RX ORDER — SIMVASTATIN 40 MG/1
40 TABLET, FILM COATED ORAL NIGHTLY
Status: DISCONTINUED | OUTPATIENT
Start: 2024-02-02 | End: 2024-02-03 | Stop reason: HOSPADM

## 2024-02-02 RX ORDER — MECLIZINE HYDROCHLORIDE 25 MG/1
25 TABLET ORAL 3 TIMES DAILY PRN
Status: DISCONTINUED | OUTPATIENT
Start: 2024-02-02 | End: 2024-02-03 | Stop reason: HOSPADM

## 2024-02-02 RX ORDER — CEPHALEXIN 500 MG/1
500 CAPSULE ORAL EVERY 8 HOURS SCHEDULED
Status: DISCONTINUED | OUTPATIENT
Start: 2024-02-02 | End: 2024-02-03 | Stop reason: HOSPADM

## 2024-02-02 RX ORDER — ONDANSETRON 4 MG/1
4 TABLET, ORALLY DISINTEGRATING ORAL EVERY 8 HOURS PRN
Status: DISCONTINUED | OUTPATIENT
Start: 2024-02-02 | End: 2024-02-03 | Stop reason: HOSPADM

## 2024-02-02 RX ORDER — ACETAMINOPHEN 325 MG/1
650 TABLET ORAL EVERY 4 HOURS PRN
Status: DISCONTINUED | OUTPATIENT
Start: 2024-02-02 | End: 2024-02-03 | Stop reason: HOSPADM

## 2024-02-02 RX ORDER — ONDANSETRON HYDROCHLORIDE 2 MG/ML
4 INJECTION, SOLUTION INTRAVENOUS EVERY 8 HOURS PRN
Status: DISCONTINUED | OUTPATIENT
Start: 2024-02-02 | End: 2024-02-03 | Stop reason: HOSPADM

## 2024-02-02 RX ORDER — ENOXAPARIN SODIUM 100 MG/ML
40 INJECTION SUBCUTANEOUS EVERY 24 HOURS
Status: DISCONTINUED | OUTPATIENT
Start: 2024-02-02 | End: 2024-02-03 | Stop reason: HOSPADM

## 2024-02-02 RX ORDER — LISINOPRIL 20 MG/1
40 TABLET ORAL DAILY
Status: DISCONTINUED | OUTPATIENT
Start: 2024-02-02 | End: 2024-02-03 | Stop reason: HOSPADM

## 2024-02-02 RX ADMIN — CEPHALEXIN 500 MG: 500 CAPSULE ORAL at 21:00

## 2024-02-02 RX ADMIN — DEXAMETHASONE SODIUM PHOSPHATE 8 MG: 4 INJECTION, SOLUTION INTRAMUSCULAR; INTRAVENOUS at 17:22

## 2024-02-02 RX ADMIN — PANTOPRAZOLE SODIUM 40 MG: 40 TABLET, DELAYED RELEASE ORAL at 06:36

## 2024-02-02 RX ADMIN — SODIUM CHLORIDE 75 ML/HR: 9 INJECTION, SOLUTION INTRAVENOUS at 06:36

## 2024-02-02 RX ADMIN — SODIUM CHLORIDE 75 ML/HR: 9 INJECTION, SOLUTION INTRAVENOUS at 17:22

## 2024-02-02 RX ADMIN — CEPHALEXIN 500 MG: 500 CAPSULE ORAL at 06:36

## 2024-02-02 RX ADMIN — CEPHALEXIN 500 MG: 500 CAPSULE ORAL at 14:59

## 2024-02-02 RX ADMIN — SIMVASTATIN 40 MG: 40 TABLET, FILM COATED ORAL at 20:55

## 2024-02-02 RX ADMIN — LISINOPRIL 40 MG: 20 TABLET ORAL at 08:58

## 2024-02-02 RX ADMIN — ENOXAPARIN SODIUM 40 MG: 40 INJECTION SUBCUTANEOUS at 08:58

## 2024-02-02 RX ADMIN — DEXAMETHASONE SODIUM PHOSPHATE 8 MG: 4 INJECTION, SOLUTION INTRAMUSCULAR; INTRAVENOUS at 23:33

## 2024-02-02 RX ADMIN — MECLIZINE HYDROCHLORIDE 25 MG: 25 TABLET ORAL at 03:56

## 2024-02-02 RX ADMIN — SODIUM CHLORIDE 1000 ML: 9 INJECTION, SOLUTION INTRAVENOUS at 03:58

## 2024-02-02 SDOH — SOCIAL STABILITY: SOCIAL INSECURITY: DO YOU FEEL ANYONE HAS EXPLOITED OR TAKEN ADVANTAGE OF YOU FINANCIALLY OR OF YOUR PERSONAL PROPERTY?: NO

## 2024-02-02 SDOH — SOCIAL STABILITY: SOCIAL INSECURITY: DOES ANYONE TRY TO KEEP YOU FROM HAVING/CONTACTING OTHER FRIENDS OR DOING THINGS OUTSIDE YOUR HOME?: NO

## 2024-02-02 SDOH — ECONOMIC STABILITY: TRANSPORTATION INSECURITY
IN THE PAST 12 MONTHS, HAS LACK OF TRANSPORTATION KEPT YOU FROM MEETINGS, WORK, OR FROM GETTING THINGS NEEDED FOR DAILY LIVING?: NO

## 2024-02-02 SDOH — ECONOMIC STABILITY: HOUSING INSECURITY: IN THE PAST 12 MONTHS HAS THE ELECTRIC, GAS, OIL, OR WATER COMPANY THREATENED TO SHUT OFF SERVICES IN YOUR HOME?: NO

## 2024-02-02 SDOH — ECONOMIC STABILITY: FOOD INSECURITY: WITHIN THE PAST 12 MONTHS, THE FOOD YOU BOUGHT JUST DIDN'T LAST AND YOU DIDN'T HAVE MONEY TO GET MORE.: NEVER TRUE

## 2024-02-02 SDOH — SOCIAL STABILITY: SOCIAL INSECURITY: HAS ANYONE EVER THREATENED TO HURT YOUR FAMILY OR YOUR PETS?: NO

## 2024-02-02 SDOH — ECONOMIC STABILITY: FOOD INSECURITY: WITHIN THE PAST 12 MONTHS, YOU WORRIED THAT YOUR FOOD WOULD RUN OUT BEFORE YOU GOT MONEY TO BUY MORE.: NEVER TRUE

## 2024-02-02 SDOH — SOCIAL STABILITY: SOCIAL INSECURITY: WERE YOU ABLE TO COMPLETE ALL THE BEHAVIORAL HEALTH SCREENINGS?: YES

## 2024-02-02 SDOH — SOCIAL STABILITY: SOCIAL INSECURITY: HAVE YOU HAD THOUGHTS OF HARMING ANYONE ELSE?: NO

## 2024-02-02 SDOH — ECONOMIC STABILITY: FOOD INSECURITY

## 2024-02-02 SDOH — ECONOMIC STABILITY: HOUSING INSECURITY
IN THE LAST 12 MONTHS, WAS THERE A TIME WHEN YOU DID NOT HAVE A STEADY PLACE TO SLEEP OR SLEPT IN A SHELTER (INCLUDING NOW)?: NO

## 2024-02-02 SDOH — ECONOMIC STABILITY: INCOME INSECURITY: IN THE LAST 12 MONTHS, WAS THERE A TIME WHEN YOU WERE NOT ABLE TO PAY THE MORTGAGE OR RENT ON TIME?: NO

## 2024-02-02 SDOH — SOCIAL STABILITY: SOCIAL INSECURITY: ARE YOU OR HAVE YOU BEEN THREATENED OR ABUSED PHYSICALLY, EMOTIONALLY, OR SEXUALLY BY ANYONE?: NO

## 2024-02-02 SDOH — SOCIAL STABILITY: SOCIAL INSECURITY: ARE THERE ANY APPARENT SIGNS OF INJURIES/BEHAVIORS THAT COULD BE RELATED TO ABUSE/NEGLECT?: NO

## 2024-02-02 SDOH — SOCIAL STABILITY: SOCIAL INSECURITY: DO YOU FEEL UNSAFE GOING BACK TO THE PLACE WHERE YOU ARE LIVING?: NO

## 2024-02-02 SDOH — ECONOMIC STABILITY: FOOD INSECURITY: WITHIN THE PAST 12 MONTHS, THE FOOD YOU BOUGHT JUST DIDN’T LAST AND YOU DIDN’T HAVE MONEY TO GET MORE.: NEVER TRUE

## 2024-02-02 SDOH — ECONOMIC STABILITY: HOUSING INSECURITY

## 2024-02-02 SDOH — ECONOMIC STABILITY: GENERAL

## 2024-02-02 SDOH — ECONOMIC STABILITY: TRANSPORTATION INSECURITY: IN THE PAST 12 MONTHS, HAS LACK OF TRANSPORTATION KEPT YOU FROM MEDICAL APPOINTMENTS OR FROM GETTING MEDICATIONS?: NO

## 2024-02-02 SDOH — SOCIAL STABILITY: SOCIAL INSECURITY: ABUSE: ADULT

## 2024-02-02 SDOH — ECONOMIC STABILITY: TRANSPORTATION INSECURITY
IN THE PAST 12 MONTHS, HAS THE LACK OF TRANSPORTATION KEPT YOU FROM MEDICAL APPOINTMENTS OR FROM GETTING MEDICATIONS?: NO

## 2024-02-02 SDOH — ECONOMIC STABILITY: FOOD INSECURITY: WITHIN THE PAST 12 MONTHS, YOU WORRIED THAT YOUR FOOD WOULD RUN OUT BEFORE YOU GOT THE MONEY TO BUY MORE.: NEVER TRUE

## 2024-02-02 SDOH — ECONOMIC STABILITY: HOUSING INSECURITY: IN THE LAST 12 MONTHS, WAS THERE A TIME WHEN YOU WERE NOT ABLE TO PAY THE MORTGAGE OR RENT ON TIME?: NO

## 2024-02-02 SDOH — ECONOMIC STABILITY: TRANSPORTATION INSECURITY

## 2024-02-02 ASSESSMENT — COGNITIVE AND FUNCTIONAL STATUS - GENERAL
MOBILITY SCORE: 21
DAILY ACTIVITIY SCORE: 23
WALKING IN HOSPITAL ROOM: A LITTLE
TOILETING: A LITTLE
CLIMB 3 TO 5 STEPS WITH RAILING: A LITTLE
PATIENT BASELINE BEDBOUND: NO
STANDING UP FROM CHAIR USING ARMS: A LITTLE

## 2024-02-02 ASSESSMENT — LIFESTYLE VARIABLES
AUDIT-C TOTAL SCORE: 2
HOW OFTEN DO YOU HAVE 6 OR MORE DRINKS ON ONE OCCASION: NEVER
HOW MANY STANDARD DRINKS CONTAINING ALCOHOL DO YOU HAVE ON A TYPICAL DAY: 1 OR 2
HOW OFTEN DO YOU HAVE A DRINK CONTAINING ALCOHOL: 2-4 TIMES A MONTH
SKIP TO QUESTIONS 9-10: 1
AUDIT-C TOTAL SCORE: 2

## 2024-02-02 ASSESSMENT — ACTIVITIES OF DAILY LIVING (ADL)
LACK_OF_TRANSPORTATION: NO
PATIENT'S MEMORY ADEQUATE TO SAFELY COMPLETE DAILY ACTIVITIES?: YES
JUDGMENT_ADEQUATE_SAFELY_COMPLETE_DAILY_ACTIVITIES: YES
ADEQUATE_TO_COMPLETE_ADL: YES
DRESSING YOURSELF: INDEPENDENT
HEARING - RIGHT EAR: HEARING AID
WALKS IN HOME: INDEPENDENT
FEEDING YOURSELF: INDEPENDENT
TOILETING: INDEPENDENT
HEARING - LEFT EAR: HEARING AID
GROOMING: INDEPENDENT
BATHING: INDEPENDENT

## 2024-02-02 ASSESSMENT — PAIN SCALES - GENERAL
PAINLEVEL_OUTOF10: 0 - NO PAIN

## 2024-02-02 ASSESSMENT — PAIN - FUNCTIONAL ASSESSMENT
PAIN_FUNCTIONAL_ASSESSMENT: 0-10

## 2024-02-02 ASSESSMENT — PATIENT HEALTH QUESTIONNAIRE - PHQ9
SUM OF ALL RESPONSES TO PHQ9 QUESTIONS 1 & 2: 0
2. FEELING DOWN, DEPRESSED OR HOPELESS: NOT AT ALL
1. LITTLE INTEREST OR PLEASURE IN DOING THINGS: NOT AT ALL

## 2024-02-02 ASSESSMENT — SOCIAL DETERMINANTS OF HEALTH (SDOH): IN THE PAST 12 MONTHS, HAS THE ELECTRIC, GAS, OIL, OR WATER COMPANY THREATENED TO SHUT OFF SERVICE IN YOUR HOME?: NO

## 2024-02-02 NOTE — PROGRESS NOTES
Pharmacy Medication History Review    Neymar Hannon is a 78 y.o. male admitted for Vertigo. Pharmacy reviewed the patient's [id number] medications and allergies for accuracy.    The list below reflectives the updated PTA list. Please review each medication in order reconciliation for additional clarification and justification.  Medications Prior to Admission   Medication Sig Dispense Refill Last Dose    cephalexin (Keflex) 500 mg capsule Take 1 capsule (500 mg) by mouth 3 times a day for 7 days. 21 capsule 0     esomeprazole (NexIUM) 20 mg DR capsule Take 1 capsule (20 mg) by mouth once daily.       lisinopril 40 mg tablet Take 1 tablet (40 mg) by mouth once daily.       simvastatin (Zocor) 40 mg tablet Take 1 tablet (40 mg) by mouth.           The list below reflectives the updated allergy list. Please review each documented allergy for additional clarification and justification.  Allergies  Reviewed by Isa James RN on 2/2/2024        Severity Reactions Comments    Amlodipine-olmesartan Low Rash             Below are additional concerns with the patient's PTA list.  Prior to Admission Medications   Prescriptions Last Dose Informant Patient Reported? Taking?   cephalexin (Keflex) 500 mg capsule   No No   Sig: Take 1 capsule (500 mg) by mouth 3 times a day for 7 days.   esomeprazole (NexIUM) 20 mg DR capsule   Yes No   Sig: Take 1 capsule (20 mg) by mouth once daily.   lisinopril 40 mg tablet   Yes No   Sig: Take 1 tablet (40 mg) by mouth once daily.   simvastatin (Zocor) 40 mg tablet   Yes No   Sig: Take 1 tablet (40 mg) by mouth.   traMADol (Ultram) 50 mg tablet Not Taking  No No   Sig: Take 1 tablet (50 mg) by mouth every 8 hours if needed for severe pain (7 - 10).   Patient not taking: Reported on 2/2/2024      Facility-Administered Medications: None    Per patient    Emily Sue CPhT

## 2024-02-02 NOTE — H&P
[facility] History and Physical      Neymar Hannon    :  1945(78 y.o.)    MRN:  [id number]  Date: 24     Assessment and Plan:      Vertigo  Recent cochlear implant surgery   HLD  GERD  HTN    Plan:  - Discussed with ENT. Dr. Boudreaux out of office today. Dr. Chucky goel who reviewed CT head, no concerning findings. Suspected post op vestibulitis, trial IV decadron 8mg q8hr x3. Antivert prn only for severe dizziness  - if symptoms resolved tomorrow can dc home in AM; if symptoms persist or worsen will need to discuss with [place] [facility] [place] possible [place] for ENT eval  - continue home lisinopril, statin, ppi  - continue keflex post op as prescribed by ENT    DVT Prophylaxis: subcutaneous Lovenox      BMI Classification: Body mass index is 29.09 kg/m². - overweight BMI 25-29.9    Disposition:  await clinical improvement and treatment response    The patient/family had opportunity to ask questions. All questions were answered to the best of my ability.    Between 7AM-7PM please message me via Epic Secure Chat.  After 7PM please page Nocturnist on call.    Electronically signed by Joao Chau DO on 24 at 5:44 PM     Subjective:      Chief Complaint: dizziness  PCP: Tj Graves MD     HPI:    Neymar Hannon is a 78 y.o. male who presented to the ED with severe dizziness.  Reports he had cochlear implant surgery on . He had some dizziness for first 2 days after surgery which resolved. However on  he had recurrence of vertigo with associated nausea, vomiting. This prompted him to home to ER. Associated mild headache. No fevers or chills. No chest pain or shortness of breath. Some improvement with meclizine given in ER. Reports when laying down symptoms are almost resolved. However they return when he tries to get up and walk around.       Past Medical History:   Diagnosis Date    Acid reflux     Hyperlipidemia     Other specified health status     No pertinent past medical history       Past Surgical  History:   Procedure Laterality Date    ADENOIDECTOMY      COLONOSCOPY      OTHER SURGICAL HISTORY Left 12/10/2019    shoulder arthroplasty    TONSILLECTOMY         Family History   Problem Relation Name Age of Onset    Other (heart problem) Other family history        Social History     Socioeconomic History    Marital status:      Spouse name: Not on file    Number of children: Not on file    Years of education: Not on file    Highest education level: Not on file   Occupational History    Not on file   Tobacco Use    Smoking status: Never    Smokeless tobacco: Never   Substance and Sexual Activity    Alcohol use: Yes     Comment: 0-2 drinks every 2 weeks    Drug use: Never    Sexual activity: Not on file   Other Topics Concern    Not on file   Social History Narrative    Not on file     Social Determinants of Health     Financial Resource Strain: Not on file   Food Insecurity: Not on file   Transportation Needs: Not on file   Physical Activity: Not on file   Stress: Not on file   Social Connections: Not on file   Intimate Partner Violence: Not on file   Housing Stability: Unknown (2/2/2024)    Housing Stability Vital Sign     Unable to Pay for Housing in the Last Year: No     Number of Places Lived in the Last Year: Not on file     Unstable Housing in the Last Year: No       Allergies   Allergen Reactions    Amlodipine-Olmesartan Rash       Prior to Admission medications    Medication Sig Start Date End Date Taking? Authorizing Provider   cephalexin (Keflex) 500 mg capsule Take 1 capsule (500 mg) by mouth 3 times a day for 7 days. 1/26/24 2/2/24  Elvia Weaver MD   esomeprazole (NexIUM) 20 mg DR capsule Take 1 capsule (20 mg) by mouth once daily. 10/30/19   Historical Provider, MD   lisinopril 40 mg tablet Take 1 tablet (40 mg) by mouth once daily.    Historical Provider, MD   simvastatin (Zocor) 40 mg tablet Take 1 tablet (40 mg) by mouth. 10/27/15   Historical Provider, MD   traMADol (Ultram) 50 mg  tablet Take 1 tablet (50 mg) by mouth every 8 hours if needed for severe pain (7 - 10).  Patient not taking: Reported on 2/2/2024 1/26/24   Elvia Weaver MD       Review of systems:   Other than patient's chronic conditions and those complaints in the history above, the rest of the 10 systems review were done and were negative.     Objective:      Vitals:    02/02/24 0745 02/02/24 0808 02/02/24 1201 02/02/24 1525   BP:  175/69 145/70 175/76   BP Location:   Right arm Right arm   Patient Position:   Lying Lying   Pulse:  53 50 50   Resp:  16 16 16   Temp:  36.7 °C (98.1 °F) 36.4 °C (97.5 °F) 36.6 °C (97.8 °F)   TempSrc:  Skin Temporal Temporal   SpO2: 98% 99% 97% 96%   Weight:       Height:            Physical Exam  Vitals and nursing note reviewed.   HENT:      Mouth/Throat:      Mouth: Mucous membranes are moist.      Pharynx: Oropharynx is clear.   Cardiovascular:      Rate and Rhythm: Normal rate and regular rhythm.   Pulmonary:      Effort: Pulmonary effort is normal.   Abdominal:      Palpations: Abdomen is soft.   Neurological:      General: No focal deficit present.      Mental Status: He is alert and oriented to person, place, and time.      Cranial Nerves: No cranial nerve deficit.      Motor: No weakness.         Labs:   Lab Results   Component Value Date     02/01/2024    K 4.2 02/01/2024     02/01/2024    CO2 24 02/01/2024    BUN 20 02/01/2024    CREATININE 1.09 02/01/2024    GLUCOSE 114 (H) 02/01/2024    CALCIUM 8.2 (L) 02/01/2024    PROT 6.2 (L) 02/01/2024    BILITOT 0.6 02/01/2024    ALKPHOS 61 02/01/2024    AST 20 02/01/2024    ALT 21 02/01/2024       Lab Results   Component Value Date    WBC 8.7 02/01/2024    HGB 12.0 (L) 02/01/2024    HCT 37.5 (L) 02/01/2024    MCV 89 02/01/2024     (L) 02/01/2024       Imaging:   CT angio head and neck w and wo IV contrast    Result Date: 2/2/2024  Interpreted By:  Carla Roche, STUDY: CT ANGIO HEAD AND NECK W AND WO IV CONTRAST;  2/1/2024  11:48 pm   INDICATION: Signs/Symptoms:vertigo recent right cochlear implant left beating nystagmus at extreme left lateral gaze.   COMPARISON: None.   ACCESSION NUMBER(S): [id number]   ORDERING CLINICIAN: CLARICE GERMAIN   TECHNIQUE: Unenhanced CT images of the head were obtained. Subsequently, contrast was administered intravenously and axial images of the head and neck were acquired.  Coronal, sagittal, and 3-D reconstructions were provided for review.   FINDINGS: Streak artifact from metallic hardware over the right parietal calvarium motion limits evaluation of the region. There is extension of the knee noted coiled in terminating in the left mastoid air cells. There has been interval postsurgical changes of a partial wall up mastoidectomy dot opacification noted of surgical bed and mastoid air cells as well as the right middle ear cavity. The left mastoid air cell and middle ear cavities are patent. Paranasal sinuses are grossly unremarkable.   Generalized parenchymal volume loss noted with concordant ventricular enlargement. Non-specific white matter changes noted, which may be related to small vessel disease. No mass effect or midline shift identified.   7 mm nodule over the left frontal bone.   CTA HEAD FINDINGS:   Anterior circulation: Atherosclerotic calcifications of the bilateral carotid siphon with mild to moderate multifocal narrowing. Otherwise the bilateral intracranial internal carotid arteries, bilateral carotid terminals, bilateral proximal anterior and middle cerebral arteries are normal.   Posterior circulation: Bilateral intracranial vertebral arteries, vertebrobasilar junction, basilar artery and proximal posterior cerebral arteries are normal.   CTA NECK FINDINGS:   Right carotid vessels: The common carotid artery is normal. Minimal atherosclerotic calcifications otherwise the carotid bifurcation is normal. The internal carotid artery in the neck is normal. There is 0% stenosis  by NASCET  criteria.   Left carotid vessels: The common carotid artery is normal. The carotid bifurcation is normal. Mild scattered atherosclerotic calcifications, otherwise the internal carotid artery in the neck is normal. There is 0% stenosis  by NASCET criteria.   Vertebral vessels:  The visualized segments of the cervical vertebral arteries are normal in caliber.   Bronchial wall thickening with mild bronchiectasis mucous plugging in the upper lobes. A calcified granuloma noted. The thyroid appears within normal limits.       No evidence for significant stenosis of the cervical vessels.   No evidence for significant stenosis or large branch vessel cutoffs of the intracranial vessels.   Mild bronchial wall thickening and mucous plugging which may be infectious or inflammatory.   Interval postsurgical changes of a right wall of partial mastoidectomy with fluid in the surgical bed, mastoid air cells and right middle ear cavity. Electrode tip terminates in the mastoidectomy. Clinical correlation suggested.   No acute intracranial hemorrhage or mass effect.   MACRO: None.   Signed by: Carla Roche 2/2/2024 12:33 AM Dictation workstation:   [id number]    XR chest 1 view    Result Date: 2/1/2024  Interpreted By:  Vianey Clark, STUDY: XR CHEST 1 VIEW;  2/1/2024 9:53 pm   INDICATION: Signs/Symptoms:Chest Pain   COMPARISON: Left shoulder x-ray 07/11/2023   ACCESSION NUMBER(S): [id number]   ORDERING CLINICIAN: CLARICE GERMAIN   TECHNIQUE: Portable upright frontal view of the chest was obtained .   FINDINGS: The heart is enlarged. There is mild interstitial edema.   No focal consolidation, pleural effusion or pneumothorax.   Redemonstration of orthopedic screws at the coracoid process of the left scapula. The patient is status post total reverse left shoulder arthroplasty.       1.  Cardiomegaly. Mild interstitial edema.       MACRO: None.   Signed by: Vianey Clark 2/1/2024 10:48 PM Dictation workstation:    [id number]

## 2024-02-02 NOTE — ED TRIAGE NOTES
Pt arrived to the ED via [facility] coming from home with a chief complaint of nausea/dizzines/vomiting. Pt endorses right ear cochlear implant surgery last week. Pt received odt zofran but vomited pill, received 4mg of zofran via iv with improvement in nausea.

## 2024-02-02 NOTE — ED PROVIDER NOTES
HPI   Chief Complaint   Patient presents with    nausea vomiting dizziness       HPI  Patient presents with dizziness symptom described as the room spinning and severe nausea vomiting since 1600 this afternoon.  He had a right cochlear implant placed on Monday.  He had some dizziness for the first 2 days but then it resolved and started feeling up today.  He did not he states he has a slight headache.  Denies any chest pain or abdominal pain.  He states he has had somewhat looser stools.                  No data recorded                Patient History   Past Medical History:   Diagnosis Date    Acid reflux     Hyperlipidemia     Other specified health status     No pertinent past medical history     Past Surgical History:   Procedure Laterality Date    ADENOIDECTOMY      COLONOSCOPY      OTHER SURGICAL HISTORY Left 12/10/2019    shoulder arthroplasty    TONSILLECTOMY       Family History   Problem Relation Name Age of Onset    Other (heart problem) Other family history      Social History     Tobacco Use    Smoking status: Never    Smokeless tobacco: Never   Substance Use Topics    Alcohol use: Yes     Comment: 0-2 drinks every 2 weeks    Drug use: Never       Physical Exam   ED Triage Vitals [02/01/24 2118]   Temperature Heart Rate Respirations BP   36.7 °C (98 °F) 98 18 172/67      Pulse Ox Temp Source Heart Rate Source Patient Position   98 % Oral Monitor Sitting      BP Location FiO2 (%)     Left arm --       Physical Exam  Vitals and nursing note reviewed.   Constitutional:       General: He is not in acute distress.     Appearance: He is well-developed.   HENT:      Head: Normocephalic and atraumatic.   Eyes:      Conjunctiva/sclera: Conjunctivae normal.   Cardiovascular:      Rate and Rhythm: Normal rate and regular rhythm.      Heart sounds: No murmur heard.  Pulmonary:      Effort: Pulmonary effort is normal. No respiratory distress.      Breath sounds: Normal breath sounds.   Abdominal:      Palpations:  Abdomen is soft.      Tenderness: There is no abdominal tenderness.   Musculoskeletal:         General: No swelling.      Cervical back: Neck supple.   Skin:     General: Skin is warm and dry.      Capillary Refill: Capillary refill takes less than 2 seconds.   Neurological:      General: No focal deficit present.      Mental Status: He is alert and oriented to person, place, and time.      Comments: Left beating nystagmus at extreme left lateral gaze   Psychiatric:         Mood and Affect: Mood normal.         ED Course & MDM   Diagnoses as of 02/01/24 2141   Vertigo       Medical Decision Making  The patient has a benign abdomen.  The patient does have nystagmus which extinguishes with forward gaze.  Feel symptoms are consistent with peripheral vertigo.  However he had recent intracranial surgery with the placement of a cochlear implant.  Will CT angio his head with contrast.  Feel that large vessel basilar stroke is less likely.  Patient's NIH is currently 0.  However the NIH is not very effective to rule out ruling out posterior stroke.  Patient CT scan shows fluid in the mastoid.  He has no focal tenderness over this area.  He states his pain is okay.  He does still however have continued vertiginous symptoms.  He states he cannot sit up or walk at this time.  No further vomiting.  Will attempt meclizine here.  Ultimate feel that this is more inflammation and would not benefit from Epley.  He may need to stay in the hospital for his intractable vertigo.    EKG interpreted by myself.  Normal sinus rhythm at a rate of 51 bpm.  Normal intervals.  Normal axis.  No signs of acute ischemia.      Procedure  Procedures     Jose Guadalupe Wang MD  02/01/24 0035       Jose Guadalupe Wang MD  02/02/24 3023

## 2024-02-03 VITALS
BODY MASS INDEX: 29.18 KG/M2 | SYSTOLIC BLOOD PRESSURE: 175 MMHG | OXYGEN SATURATION: 95 % | RESPIRATION RATE: 18 BRPM | TEMPERATURE: 97.7 F | DIASTOLIC BLOOD PRESSURE: 78 MMHG | HEIGHT: 69 IN | HEART RATE: 56 BPM | WEIGHT: 197 LBS

## 2024-02-03 PROCEDURE — G0378 HOSPITAL OBSERVATION PER HR: HCPCS

## 2024-02-03 PROCEDURE — 2500000004 HC RX 250 GENERAL PHARMACY W/ HCPCS (ALT 636 FOR OP/ED): Performed by: HOSPITALIST

## 2024-02-03 PROCEDURE — 96376 TX/PRO/DX INJ SAME DRUG ADON: CPT

## 2024-02-03 PROCEDURE — 2500000001 HC RX 250 WO HCPCS SELF ADMINISTERED DRUGS (ALT 637 FOR MEDICARE OP): Performed by: HOSPITALIST

## 2024-02-03 RX ADMIN — LISINOPRIL 40 MG: 20 TABLET ORAL at 08:25

## 2024-02-03 RX ADMIN — PANTOPRAZOLE SODIUM 40 MG: 40 TABLET, DELAYED RELEASE ORAL at 06:05

## 2024-02-03 RX ADMIN — ENOXAPARIN SODIUM 40 MG: 40 INJECTION SUBCUTANEOUS at 08:25

## 2024-02-03 RX ADMIN — CEPHALEXIN 500 MG: 500 CAPSULE ORAL at 06:05

## 2024-02-03 RX ADMIN — DEXAMETHASONE SODIUM PHOSPHATE 8 MG: 4 INJECTION, SOLUTION INTRAMUSCULAR; INTRAVENOUS at 06:41

## 2024-02-03 ASSESSMENT — COGNITIVE AND FUNCTIONAL STATUS - GENERAL
TOILETING: A LITTLE
CLIMB 3 TO 5 STEPS WITH RAILING: A LITTLE
WALKING IN HOSPITAL ROOM: A LITTLE
CLIMB 3 TO 5 STEPS WITH RAILING: A LITTLE
DAILY ACTIVITIY SCORE: 24
MOBILITY SCORE: 21
STANDING UP FROM CHAIR USING ARMS: A LITTLE
MOBILITY SCORE: 23
DAILY ACTIVITIY SCORE: 23

## 2024-02-03 ASSESSMENT — PAIN - FUNCTIONAL ASSESSMENT: PAIN_FUNCTIONAL_ASSESSMENT: 0-10

## 2024-02-03 ASSESSMENT — PAIN SCALES - GENERAL: PAINLEVEL_OUTOF10: 0 - NO PAIN

## 2024-02-03 NOTE — DISCHARGE SUMMARY
Discharge Diagnosis  Vertigo    Issues Requiring Follow-Up      Discharge Meds     Your medication list        CONTINUE taking these medications        Instructions Last Dose Given Next Dose Due   esomeprazole 20 mg DR capsule  Commonly known as: NexIUM           lisinopril 40 mg tablet           simvastatin 40 mg tablet  Commonly known as: Zocor                  STOP taking these medications      cephalexin 500 mg capsule  Commonly known as: Keflex                 Test Results Pending At Discharge  Pending Labs       Order Current Status    Extra Urine Gray Tube Collected (02/02/24 0112)    Urinalysis with Reflex Culture and Microscopic In process            Hospital Course  Neymar Hannon is a 78 y.o. male who presented to the ED with severe dizziness.  Reports he had cochlear implant surgery on 1/26. He had some dizziness for first 2 days after surgery which resolved. However on 2/1 he had recurrence of vertigo with associated nausea, vomiting. This prompted him to home to ER. Associated mild headache. No fevers or chills. No chest pain or shortness of breath. Some improvement with meclizine given in ER. Reports when laying down symptoms are almost resolved. However they return when he tries to get up and walk around.     Vertigo  Recent cochlear implant surgery 1/26  HLD  GERD  HTN     Plan:  - Discussed with ENT. Dr. Boudreaux out of office today. Dr. Locke covering who reviewed CT head, no concerning findings. Suspected post op vestibulitis, trial IV decadron 8mg q8hr x3. Antivert prn only for severe dizziness  - symptoms completely resolved after IV decadron  - outpatient follow up with Dr. Boudreaux-- appointment requested   - completed post op keflex course as recommended by ENT  - continue home lisinopril, statin, ppi     Disposition:   Medically stable for discharge home.  Follow up with PCP and Dr. Boudreaux (requested).      Discussed case and discharge with Dr. Gottlieb.         Pertinent Physical Exam At Time of  Discharge  Physical Exam    Outpatient Follow-Up  Future Appointments   Date Time Provider Department [place]   2/20/2024 10:15 AM aMry Kay Boudreaux MD [id number] [facility]   2/20/2024 10:30 AM Nba Allen [id number] [facility]   3/7/2024  1:00 PM Nba Allen [id number] [facility]   3/11/2024  9:00 AM RADHA Cortez [id number] [facility]   4/3/2024  1:00 PM Nba Allen [id number] [facility]   5/15/2024 10:30 AM Nba Allen [id number] [facility]         Anika Finch, GERALDO-CNP

## 2024-02-07 ENCOUNTER — OFFICE VISIT (OUTPATIENT)
Dept: OTOLARYNGOLOGY | Facility: CLINIC | Age: 79
End: 2024-02-07
Payer: MEDICARE

## 2024-02-07 ENCOUNTER — CLINICAL SUPPORT (OUTPATIENT)
Dept: AUDIOLOGY | Facility: HOSPITAL | Age: 79
End: 2024-02-07
Payer: MEDICARE

## 2024-02-07 DIAGNOSIS — H90.3 SENSORINEURAL HEARING LOSS (SNHL) OF BOTH EARS: Primary | ICD-10-CM

## 2024-02-07 DIAGNOSIS — H90.3 SNHL (SENSORY-NEURAL HEARING LOSS), ASYMMETRICAL: Primary | ICD-10-CM

## 2024-02-07 DIAGNOSIS — Z98.890 POST-OPERATIVE STATE: ICD-10-CM

## 2024-02-07 DIAGNOSIS — R42 VERTIGO: ICD-10-CM

## 2024-02-07 DIAGNOSIS — Z96.21 COCHLEAR IMPLANT IN PLACE: ICD-10-CM

## 2024-02-07 PROCEDURE — 92557 COMPREHENSIVE HEARING TEST: CPT

## 2024-02-07 PROCEDURE — 1160F RVW MEDS BY RX/DR IN RCRD: CPT | Performed by: OTOLARYNGOLOGY

## 2024-02-07 PROCEDURE — 1126F AMNT PAIN NOTED NONE PRSNT: CPT | Performed by: OTOLARYNGOLOGY

## 2024-02-07 PROCEDURE — 92567 TYMPANOMETRY: CPT

## 2024-02-07 PROCEDURE — 1036F TOBACCO NON-USER: CPT | Performed by: OTOLARYNGOLOGY

## 2024-02-07 PROCEDURE — 1159F MED LIST DOCD IN RCRD: CPT | Performed by: OTOLARYNGOLOGY

## 2024-02-07 PROCEDURE — 99024 POSTOP FOLLOW-UP VISIT: CPT | Performed by: OTOLARYNGOLOGY

## 2024-02-07 NOTE — PROGRESS NOTES
77 yo with h/o bilateral SNHL. S/p right CI 10 days ago. On Friday he experienced severe dizziness and n/v. Went to the ER and was observed at [facility] for 2 days. He reports that he is feeling back to baseline with the exception that he feels that his left hearing is down.     Exam:   TM intact both ears, no fluid in either ear  FN 1/6 bilaterally        I personally reviewed the audio from today which confirms preservation of hearing in the right ear and stable thresholds on the left.     77 yo s/p right CI complicated by post op vertigo requiring admision to [facility]. Here for follow up. Balance and n/v resolved. Hearing stable. Discussed that the perception of decline in function of the left ear is due to the loss of binural summation. Cleared for activation on Feb 20. Follow up with me in 3mo

## 2024-02-07 NOTE — PROGRESS NOTES
ADULT AUDIOLOGY EVALUATION    Name:  Neymar Hannon  :  1945  Age:  78 y.o.  Date of Evaluation:  2024    IMPRESSIONS     Today's test results indicate normal middle ear functioning with asymmetric sensorineural hearing loss; right ear with moderately-severe sloping to profound sensorineural hearing loss and left ear with moderate sloping to severe sensorineural hearing loss.     This is a progression in hearing loss in the right ear, which is post-operative from obtaining a cochlear implant. The left ear hearing thresholds remain stable however word recognition did decrease from 56% to 40%. Jayden was encouraged to continue left-sided hearing aid use during all waking hours. He will be seen in the coming weeks for initial stimulation of his right-sided cochlear implant.     RECOMMENDATIONS     Follow up with Dr. Boudreaux for post-operative check on 2024 as scheduled.   Follow up with Dr. Lamas for CI appointment on 2024 as scheduled     Time: 14:26-14:50    HISTORY     Neymar Hannon, 78 year old male, was seen today for an urgent audiologic evaluation due to suspected sudden hearing loss in the left ear. He is s/p right ear cochlear implantation by Dr. Boudreaux on 2024. He reported that on 2024 he became dizzy and started to feel faint, as if he were to pass out. He presented to [facility] ER where he was later admitted. He reported improvement in symptoms with Meclizine. He felt the most dizzy when standing and walking, not while laying down. The dizziness only lasted about a day or two and has since subsided. He also reported that around this time it felt as if he could not hear as well from his left ear, despite the use of his hearing aid. Post-op audiologic evaluation was ordered due to suspected sudden hearing loss and suspicion for post-op vestibulitis or labyrinthitis.     Today he denied dizziness, increased perception of tinnitus, aural fullness, otalgia,  otorrhea, or any other otologic issues.     EVALUATION         TEST RESULTS     Otoscopic Evaluation:  Right Ear: Clear ear canal with visible tympanic membrane.   Left Ear: Clear ear canal with visible tympanic membrane.    Tympanometry:   Right Ear: Normal, type A tympanogram with normal ear canal volume, peak pressure and compliance.   Left Ear: Normal, type A tympanogram with normal ear canal volume, peak pressure and compliance.     Ipsilateral Acoustic Reflexes:   Did not test - previously established as absent.     Pure Tone Audiometry:    Right Ear: Moderately-severe at 125 Hz sloping to profound sensorineural hearing loss through 8000 Hz.   Left Ear: Moderate at 125 Hz sloping to severe sensorineural hearing loss through 8000 Hz.    Speech Audiometry:   Right Ear:  Speech Reception Threshold (SRT) was obtained at 90 dBHL. Word Recognition scores were very poor (8%) in quiet when words were presented at 105 dBHL.  Left Ear:  Speech Reception Threshold (SRT) was obtained at 60 dBHL. Word Recognition scores were poor (40%) in quiet when words were presented at 95 dBHL.      GIOVANNY Ruiz, CCC-A  Licensed Clinical Audiologist    Degree of Hearing Sensitivity Decibel Range   Within Normal Limits (WNL) 0-25   Mild 26-40   Moderate 41-55   Moderately-Severe 56-70   Severe 71-90   Profound 91+      Key   CNT/DNT Could Not Test/Did Not Test   TM Tympanic Membrane   WNL Within Normal Limits   HA Hearing Aid   SNHL Sensorineural Hearing Loss   CHL Conductive Hearing Loss   NIHL Noise-Induced Hearing Loss   ECV Ear Canal Volume   MLV Monitored Live Voice

## 2024-02-20 ENCOUNTER — APPOINTMENT (OUTPATIENT)
Dept: OTOLARYNGOLOGY | Facility: CLINIC | Age: 79
End: 2024-02-20
Payer: MEDICARE

## 2024-02-20 ENCOUNTER — CLINICAL SUPPORT (OUTPATIENT)
Dept: AUDIOLOGY | Facility: CLINIC | Age: 79
End: 2024-02-20
Payer: MEDICARE

## 2024-02-20 DIAGNOSIS — H90.3 ASYMMETRICAL SENSORINEURAL HEARING LOSS: Primary | ICD-10-CM

## 2024-02-20 PROCEDURE — 92567 TYMPANOMETRY: CPT | Mod: 59

## 2024-02-20 PROCEDURE — 92557 COMPREHENSIVE HEARING TEST: CPT | Mod: 59

## 2024-02-20 PROCEDURE — 92603 COCHLEAR IMPLT F/UP EXAM 7/>: CPT

## 2024-02-20 NOTE — PROGRESS NOTES
ADULT COCHLEAR IMPLANT ACTIVATION     Clinical Indication: sensorineural hearing loss    Internal Device:   Surgeon: Janusz  Surgery Date: 1/26/2024  Processor:  N8  Audiologist: Adams  Activation Date:  Ear: Right    Serial Number:  [id number]  Warranty Date:  Magnet Strength: 2i  Color: Beige    HISTORY:  Neymar was seen with his wife for the activation of his right N8 [employer] processor used in conjunction with a  implant placed by Dr. Boudreaux on 1/26/2024. He was seen by Dr. Darshana Almendarez for a hearing assessment 1 week after surgery due to dizziness and decline in left hearing. A hearing assessment confirmed a left decline. Today he reports his left hearing fluctuates and that he is not hearing at all from his right ear.       Position Program Description   1 Psyhcmap  SCAN   2 C+5 SCAN   3 C+10 SCAN   4 C+15 \SCAN     *EAS not used at this time due to reduced thresholds    UNAIDED HEARING ASSESSMENT:  -Normal tympanograms bilaterally    A hearing assessment fr 125 Hz - 8000 Hz of the left ear indicated a moderate sloping to severe sensorineural hearing loss, progressed since 2/7/2024    A hearing assessment fr 125 Hz - 8000 Hz of the right  ear indicated a severe to profound sensorineural hearing loss. Due to the current thresholds I did not program using an EAS.    PROGRAM PARAMETERS:  Programming was completed using the external processor.  Impedances were within normal limits..  Programming was completed using a psychophysical 10 point approach. I attempted the JEREMIAH approach; however, Neymar's compliances was low and required a change in pulse width from 37 to 50 so I readjusted my approach. The patient was subjectively satisfied with today's programming and denied any loudness discomfort. He was able to repeat ling sounds and reported he was hearing better.     Use and care of the external equipment were reviewed.  The patient demonstrated understanding of how to use the equipment;  including, charging batteries, connecting and removing battery from processor, basic knowledge of indicator lights, placement and removal of external processor onto internal processor, and program/volume control via their remote assistant or cell phone application. The warranty was discussed and the registration paperwork was completed.  The patient was counseled regarding realistic expectations and communications strategies.     Processor was paired to phone and Cochlear marga was demonstrated.     The patient is to start in P1 for 2-3 days and then move to P2 if able. The patient is to continue working through programs as he is able. The patient should return for his 2 week follow-up visit, sooner if needed.    ACTIVATION QUESTIONS  Did the patient meet with the [employer]  prior to surgery?   Did the patient find this meeting helpful? N/A  Would the patient recommend this meeting to a family member or friend who is considering a cochlear implant? N/A  Was the patient more prepared for CI programming/activation due to meeting with the [employer] ? N/A.     Time spent with patient: one hour and 45 minutes      Completed by:  Nba Willett, CCC-A, [facility]  Licensed Audiologist

## 2024-02-23 ENCOUNTER — TELEPHONE (OUTPATIENT)
Dept: OTOLARYNGOLOGY | Facility: CLINIC | Age: 79
End: 2024-02-23
Payer: MEDICARE

## 2024-02-23 NOTE — TELEPHONE ENCOUNTER
Patient reports skin irritation and redness of the ear anchor portion of his CI processor. He put a bandaid behind the skin of his ear to help cushion and protect the area which he states has improved today using this. He reports no redness or irritation with the magnet portion of the CI. Contacted patient's audiologist for recommendations.   
No

## 2024-03-07 ENCOUNTER — CLINICAL SUPPORT (OUTPATIENT)
Dept: AUDIOLOGY | Facility: CLINIC | Age: 79
End: 2024-03-07
Payer: MEDICARE

## 2024-03-07 DIAGNOSIS — H90.3 SENSORINEURAL HEARING LOSS, BILATERAL: Primary | ICD-10-CM

## 2024-03-07 PROCEDURE — 92567 TYMPANOMETRY: CPT | Mod: 59

## 2024-03-07 PROCEDURE — 92557 COMPREHENSIVE HEARING TEST: CPT | Mod: 59

## 2024-03-07 PROCEDURE — 92603 COCHLEAR IMPLT F/UP EXAM 7/>: CPT

## 2024-03-07 NOTE — PROGRESS NOTES
ADULT COCHLEAR IMPLANT 2ND STIMULATION     Clinical Indication: sensorineural hearing loss    Internal Device:   Surgeon: Janusz  Surgery Date: 1/26/2024  Processor:  N8 w/ EAS  Audiologist: Adams  Activation Date: 2/20/2024  Ear: Right    Serial Number:  [id number]  Magnet Strength: 2i, monitor. Ordered a 1i to be mailed to his house  Color: Beige    HISTORY:  Neymar was seen with his wife for the 2nd stimulation of his right N8 [employer] processor used in conjunction with a  implant placed by Dr. Boudreaux on 1/26/2024. He arrives reporting an echo when he listening with her cochlear implant, especially when he changes the volume. He reports his hearing feels stable since his last assessment but that he can use the phone again in the left ear.      Position Program Description   1 Psyhcmap EAS SCAN   2 C+5 EAS SCAN   3 C+10 EAS SCAN   4 C+15 EAS SCAN     *EAS added  Ordered 12 mm power from [employer] for EAS  due to 10 mm being too small.     UNAIDED HEARING ASSESSMENT:  -Normal tympanograms bilaterally    A hearing assessment fr 125 Hz - 8000 Hz of the left ear indicated a mild sloping to severe sensorineural hearing loss, progressed since 2/7/2024. Improved     A hearing assessment fr 125 Hz - 8000 Hz of the right  ear indicated a moderate to profound sensorineural hearing loss. Due to the current thresholds I did program using an EAS.    PROGRAM PARAMETERS:  Programming was completed using the external processor.  Impedances were within normal limits. Data logging indicate about 12.5 Programming was completed using a psychophysical 10 point approach.  The patient was subjectively satisfied with today's programming and denied any loudness discomfort. He was able to repeat ling sounds, closed set spondee words and open set sentences. He is exceeding my expectations for the 2nd stimulation today.     Use and care of the external equipment were reviewed.  The patient demonstrated  understanding of how to use the equipment; including, charging batteries, connecting and removing battery from processor, basic knowledge of indicator lights, placement and removal of external processor onto internal processor, and program/volume control via their remote assistant or cell phone application. The warranty was discussed and the registration paperwork was completed.  The patient was counseled regarding realistic expectations and communications strategies.     He arrives with an attempt to pair his mini kiran and TV streamer; however, only the TV streamer was paired so I re paired both the mini kiran and TV streamer. I also activated T-coil.     The patient is to start in P1 for 2-3 days and then move to P2 if able. The patient is to continue working through programs as he is able. The patient should return for his 2 week follow-up visit, sooner if needed.    ACTIVATION QUESTIONS  Did the patient meet with the [facility]  prior to surgery?   Did the patient find this meeting helpful? N/A  Would the patient recommend this meeting to a family member or friend who is considering a cochlear implant? N/A  Was the patient more prepared for CI programming/activation due to meeting with the [facility] ? N/A.     Time spent with patient: one hour and 45 minutes    Completed by:  Nba Willett, CCC-A, [facility]  Licensed Audiologist

## 2024-03-11 ENCOUNTER — TREATMENT (OUTPATIENT)
Dept: SPEECH THERAPY | Facility: CLINIC | Age: 79
End: 2024-03-11
Payer: MEDICARE

## 2024-03-11 DIAGNOSIS — H90.3 SENSORINEURAL HEARING LOSS (SNHL) OF BOTH EARS: Primary | ICD-10-CM

## 2024-03-11 DIAGNOSIS — R48.8 OTHER SYMBOLIC DYSFUNCTIONS: ICD-10-CM

## 2024-03-11 PROCEDURE — 92507 TX SP LANG VOICE COMM INDIV: CPT | Mod: GN

## 2024-03-11 PROCEDURE — 92523 SPEECH SOUND LANG COMPREHEN: CPT | Mod: GN

## 2024-03-11 ASSESSMENT — PAIN - FUNCTIONAL ASSESSMENT: PAIN_FUNCTIONAL_ASSESSMENT: 0-10

## 2024-03-11 ASSESSMENT — PAIN SCALES - GENERAL: PAINLEVEL_OUTOF10: 0 - NO PAIN

## 2024-03-11 NOTE — PROGRESS NOTES
Speech-Language Pathology    Auditory Evaluation      Patient Name: Neymar Hannon  MRN: [id number]  Today's Date: 3/11/2024  Time Calculation  Start Time: 0920  Stop Time: 1115  Time Calculation (min): 115 min         Current Problem:  Patient Active Problem List   Diagnosis    Acromioclavicular joint separation, type 3, right, initial encounter    Shoulder arthritis    Sensorineural hearing loss (SNHL) of both ears    Cognitive communication deficit    Other symbolic dysfunctions    Vertigo    Cochlear implant in place          Recommendations:  Recommendations: Communication Therapy, Home Program    Resonance-Voice Assessment:  Assessments Used: Informal  Articulation Screening: Age appropriate  Nasal Resonance: Normal  Nasal Air Emissions: Not present  Voice: Normal  Speech Inteligibility: 100%    Patient Subjective Assessment:  On TELEGRAM, Pt Reported Score - Telephone: 5  Pt Reported Score - Groups: 5  Pt Reported Score - Employment: 5  Pt Reported Score - Recreation: 5  Pt Reported Score - Legislation: 5  Pt Reported Score - Alarms: 1  Pt Reported Score - Entertainment: 5  Pt Reported Score - Members of family: 1    Auditory Skill Assessment:  Amplification used skill assessment: Right    Auditory Plan of Care:  Recommend Treatment: Yes  Frequency: 1 time per month  Duration: 6 months  Recommendations for Therapeutic Intervention: Given a Home Program Targeting Goal(s)  Prognosis: Excellent  Factors Affecting Prognosis: None  Discussed POC: Patient  The patient's family/caregiver was educated regarding appropriate motivation and expectations for a cochlear implant (CI) and the CI process.: Yes      Subjective   Current Problem: Aural Rehabilitation s/p cochlear implantation    General Visit Information:  Recommendations: Communication Therapy, Home Program  Living Environment: Home  Language at home: Spoken English   Present: No  Arrival: Independent  Reason for Referral: Aural rehabilitation s/p  cochlear implantation  Certification Period Start Date: 24  Certification Period End Date: 24  Number of  Authorized Treatments :  (Unlimited due to medical necessity)  Prior Level of Function: WF    Provider:  Audiology: Kyleigh Lamas  ENT: Mary Kay Boudreaux    Pain:  Pain Assessment  Pain Assessment: 0-10  Pain Score: 0 - No pain    Objective     Baseline Observations:  Hearing Loss: Progressive (Over time)  Duration of Hearing Loss: 20+ years  Etiology of hearing loss: unknown  Current Amplification: Worn during evaluation  Left Ear: Hearing Aid  Right Ear: Cochlear Implant (Using EAS component)  Date of amplification right: 2024  Amplification Worn During Evaluation: Yes (Wore Cochlear Implant with EAS; no contralateral hearing aid)  Hours Worn: Whenever awake    LMH 10 Task:       Pre-Feature Identification Contrasts:  Presence vs Absence : 100%  Long vs Short : 100%  Continuous vs Interrupted : 100%  1 vs 3 Syllables : 100%  1 vs 2 Syllables : 100%  Same number of syllables : 100%  Total Percentage: 100%    Cochlear [facility] Manual Screening & Exercise:  Sentence Length Identification : 100%  Phrase Length Identification : 100%  Paragraph Tracking : 100%  CCRMS Comments:: Suprasegmentals identified with 100% accuracy    Common Phrase Test:  Common Phrase Test (CPT)  In Quiet : 30%    Minimal Pairs Test:  Voicin/8  Manner: 7/8  Place: 2/8  Vowel Place: 8/8  Vowel Height: 7/8  Total Score : 32    CAST - Recognition of:       Cognition Skill Assessment:       SRCD - Severity Rating Cognitive Domains:       Auditory Education:  Treatment Performed Today: Yes  Individual(s) Educated: Patient  Verbal Education Provided: Risks/Benefits of Therapy, Results of Testing, Exercises  Written Education Provided: Exercises  Response to Educaton: Verbalized Understanding, Patient/caregiver asked appropriate questions  Patient's Learning Preference(s): Demonstration, Printed Materials,  Explanation/Discussion  Patient/caregiver verbalized understanding and agreement.: Yes

## 2024-03-11 NOTE — PROGRESS NOTES
Speech-Language Pathology    Auditory Evaluation      Patient Name: Neymar Hannon  MRN: [id number]  Today's Date: 3/11/2024  Time Calculation  Start Time: 0920  Stop Time: 1115  Time Calculation (min): 115 min         Current Problem:  Patient Active Problem List   Diagnosis    Acromioclavicular joint separation, type 3, right, initial encounter    Shoulder arthritis    Sensorineural hearing loss (SNHL) of both ears    Cognitive communication deficit    Other symbolic dysfunctions    Vertigo    Cochlear implant in place          Recommendations:  Recommendations: Communication Therapy, Home Program    Resonance-Voice Assessment:  Assessments Used: Informal  Articulation Screening: Age appropriate  Nasal Resonance: Normal  Nasal Air Emissions: Not present  Voice: Normal  Speech Inteligibility: 100%    Patient Subjective Assessment:  On TELEGRAM, Pt Reported Score - Telephone: 5  Pt Reported Score - Groups: 5  Pt Reported Score - Employment: 5  Pt Reported Score - Recreation: 5  Pt Reported Score - Legislation: 5  Pt Reported Score - Alarms: 1  Pt Reported Score - Entertainment: 5  Pt Reported Score - Members of family: 1    Auditory Skill Assessment:  Amplification used skill assessment: Right    Auditory Plan of Care:  Recommend Treatment: Yes  Frequency: 1 time per month  Duration: 6 months  Recommendations for Therapeutic Intervention: Given a Home Program Targeting Goal(s)  Prognosis: Excellent  Factors Affecting Prognosis: None  Discussed POC: Patient  The patient's family/caregiver was educated regarding appropriate motivation and expectations for a cochlear implant (CI) and the CI process.: Yes      Subjective   Current Problem: SEVERE AUDITORY SKILL DEFICITS IMPACTING COMMUNICATION    General Visit Information:  Recommendations: Communication Therapy, Home Program  Living Environment: Home  Language at home: Spoken English   Present: No  Arrival: Independent  Reason for Referral: Aural rehabilitation  s/p cochlear implantation  Certification Period Start Date: 24  Certification Period End Date: 24  Number of  Authorized Treatments :  (Unlimited due to medical necessity)  Prior Level of Function: [facility]    Provider:  Audiology: Kyleigh Lamas  ENT: Mary Kay Boudreaux    Pain:  Pain Assessment  Pain Assessment: 0-10  Pain Score: 0 - No pain    Objective     Baseline Observations:  Hearing Loss: Progressive (Over time)  Duration of Hearing Loss: 20+ years  Etiology of hearing loss: unknown  Current Amplification: Worn during evaluation  Left Ear: Hearing Aid  Right Ear: Cochlear Implant (Using EAS component)  Date of amplification right: 2024  Amplification Worn During Evaluation: Yes (Wore Cochlear Implant with EAS; no contralateral hearing aid)  Hours Worn: Whenever awake      Pre-Feature Identification Contrasts:  Presence vs Absence : 100%  Long vs Short : 100%  Continuous vs Interrupted : 100%  1 vs 3 Syllables : 100%  1 vs 2 Syllables : 100%  Same number of syllables : 100%  Total Percentage: 100%    Cochlear [facility] Re[facility] Manual Screening & Exercise:  Sentence Length Identification : 100%  Phrase Length Identification : 100%  Paragraph Tracking : 100%  CCRMS Comments:: Suprasegmentals identified with 100% accuracy    Common Phrase Test:  Common Phrase Test (CPT)  In Quiet : 30%    Minimal Pairs Test:  Voicin/8  Manner: 7/8  Place: 2/8  Vowel Place: 8/8  Vowel Height: 7/8  Total Score : 32    Auditory Education:  Treatment Performed Today: Yes  Individual(s) Educated: Patient  Verbal Education Provided: Risks/Benefits of Therapy, Results of Testing, Exercises  Written Education Provided: Exercises  Response to Educaton: Verbalized Understanding, Patient/caregiver asked appropriate questions  Patient's Learning Preference(s): Demonstration, Printed Materials, Explanation/Discussion  Patient/caregiver verbalized understanding and agreement.: Yes      SLP Adult Outpatient Speech-Language Pathology  Treatment     Patient Name: Neyamr Hannon  MRN: [id number]  Today's Date: 3/11/2024  Time Calculation  Start Time: 0920  Stop Time: 1115  Time Calculation (min): 115 min  35 minutes spent in therapy         SLP Assessment:  SLP TX Intervention Outcome: Making Progress Towards Goals  SLP Assessment Results: Other (Comment)  Prognosis: Excellent  Treatment Tolerance: Patient tolerated treatment well  Strengths: Motivation  Barriers: None  Education Provided: Yes       Plan:  Inpatient/Swing Bed or Outpatient: Outpatient  Treatment/Interventions: Aural rehab  SLP TX Plan: Continue Plan of Care  SLP Plan: Skilled SLP  SLP Frequency: 1x per week  Duration: 6 months  SLP Discharge Recommendations: Outpatient SLP  Discussed POC: Patient  Discussed Risks/Benefits: Yes  Patient/Caregiver Agreeable: Yes  SLP - OK to Discharge: Yes      Subjective   Current Problem:  SEVERE Auditory Skill Deficits Impacting Communication    General Visit Information:   Reason for Referral: Aural Rehabilitation s/p cochlear implantation  Patient Seen During This Visit: Yes  Arrival: Independent  Certification Period Start Date: 03/11/24  Certification Period End Date: 06/11/24  Number of Authorized Treatments : Unlimited due to Medical Necessity  Total Number of Visits : 1    Pain Assessment:   Pain Assessment: 0-10  Pain Score: 0 - No pain      Objective   Hearing:  Hearing Interventions: Provided on this Date  Hearing Comments: Provided skilled Intervention targeting auditory skill deficits    Long Term Goal:  Comprehend conversation with 80% accuracy in 6 months    Short Term Goals:  Identify consonants varying in manner, voicing, or place cues with 90% accuracy in 3 months  Date Initiated: 3/11/2024  PROGRESS:  Gave information regarding home program.  Patient was able to independently use the marga and got words varying in word length with 80% accuracy.  STATUS:  CONTINUE goal; making anticipated progress    Comprehend sentences with no visual  cues with 90% accuracy in 3 months  Date Initiated: 3/11/2024  PROGRESS Introduced home program to target comprehension of sentences.  Patient has already attempted audiobooks so made adjustments to audiobooks to meet patient's current performance levels.  Patient was able to demonstrate what to do and recall how to do exercises. Gave written information about a home program.  STATUS:  CONTINUE goal; making anticipated progress    Comprehend paragraph with no visual cues with 90% accuracy in 5 months  Date Initiated: 3/11/2024  PROGRESS: DID NOT TARGET  STATUS:  CONTINUE goal; making anticipated progress    HOME PROGRAM:  Word Success, Audiobooks, MUKESH Talks    Outpatient Education:  Adult Outpatient Education  Individual(s) Educated: Patient  Written Education : Home Program  Verbal Education : Results of Testing, Prognosis, home program  Diagnosis and Precautions: SEVERE Auditory Skill Deficits Impacting Communication  Risk and Benefits Discussed with Patient/Caregiver/Other: yes  Patient/Caregiver Demonstrated Understanding: yes  Plan of Care Discussed and Agreed Upon: yes  Patient Response to Education: Patient/Caregiver Verbalized Understanding of Information  Education Comment: Answered questions about Cochlear Implants, Aural Rehab, and Cochlear Implant Expectations    Consultations/Referrals/Coordination of Services: Follow up with ENT and Audiology as recommended.

## 2024-03-18 ENCOUNTER — TREATMENT (OUTPATIENT)
Dept: SPEECH THERAPY | Facility: CLINIC | Age: 79
End: 2024-03-18
Payer: MEDICARE

## 2024-03-18 ENCOUNTER — CLINICAL SUPPORT (OUTPATIENT)
Dept: AUDIOLOGY | Facility: CLINIC | Age: 79
End: 2024-03-18
Payer: MEDICARE

## 2024-03-18 DIAGNOSIS — R48.8 OTHER SYMBOLIC DYSFUNCTIONS: ICD-10-CM

## 2024-03-18 DIAGNOSIS — H90.3 ASYMMETRICAL SENSORINEURAL HEARING LOSS: Primary | ICD-10-CM

## 2024-03-18 DIAGNOSIS — H90.3 SENSORINEURAL HEARING LOSS (SNHL) OF BOTH EARS: Primary | ICD-10-CM

## 2024-03-18 PROCEDURE — 92603 COCHLEAR IMPLT F/UP EXAM 7/>: CPT

## 2024-03-18 PROCEDURE — 92507 TX SP LANG VOICE COMM INDIV: CPT | Mod: GN

## 2024-03-18 ASSESSMENT — PAIN - FUNCTIONAL ASSESSMENT: PAIN_FUNCTIONAL_ASSESSMENT: 0-10

## 2024-03-18 ASSESSMENT — PAIN SCALES - GENERAL: PAINLEVEL_OUTOF10: 0 - NO PAIN

## 2024-03-18 NOTE — PROGRESS NOTES
ADULT COCHLEAR IMPLANT TROUBLESHOOTING    Clinical Indication: sensorineural hearing loss    Internal Device:   Surgeon: Janusz  Surgery Date: 1/26/2024  Processor:  N8 w/ EAS  Audiologist: Kameron  Activation Date: 2/20/2024  Ear: Right    Serial Number:  [id number]  Magnet Strength: 2i, monitor. Ordered a 1i to be mailed to his house  Color: Beige    HISTORY:  Neymar was seen with his wife for troubleshooting of his right N8 Cochlear [employer] processor used in conjunction with a  implant placed by Dr. Boudreaux on 1/26/2024. He arrives reporting an echo when he listening with her cochlear implant, especially when he changes the volume. After some conversation, it was determined it was a mix of feeling like he is in a barrel and an echo on top of his voice. He reports his hearing feels stable since his last assessment but that he can use the phone again in the left ear.      Position Program Description   1 C-5 SCAN   2 Psyhcmap EAS SCAN   3 C+5 EAS SCAN   4 C+10 EAS SCAN     Changed from 10 mm power to 12 mm vented. Immediate improvement in barrel sensation     PROGRAM PARAMETERS:  Programming was completed using the external processor.  Impedances were within normal limits. Data logging indicate about 13.4 hours. Programming was completed based on the patient's perception of the echo. All programming changes were made to the acoustic portion of the system. He reported after programming he no longer felt he was in a barrel but that the echo in his voice was improved but still present. He is still exceeding expectations of his cochlear progression.     ACTIVATION QUESTIONS  Did the patient meet with the [employer]  prior to surgery?   Did the patient find this meeting helpful? N/A  Would the patient recommend this meeting to a family member or friend who is considering a cochlear implant? N/A  Was the patient more prepared for CI programming/activation due to meeting with the  [employer] ? N/A.     Time spent with patient: one hour and 45 minutes    Completed by:  Nba Goldsmith, CCC-A, [facility]  Licensed Audiologist

## 2024-03-18 NOTE — PROGRESS NOTES
Speech-Language Pathology    SLP Adult Outpatient Speech-Language Pathology Treatment     Patient Name: Neymar Hannon  MRN: [id number]  Today's Date: 3/18/2024  Time Calculation  Start Time: 0735  Stop Time: 0835  Time Calculation (min): 60 min         Current Problem:   1. Sensorineural hearing loss (SNHL) of both ears        2. Other symbolic dysfunctions              SLP Assessment:  SLP TX Intervention Outcome: Making Progress Towards Goals  SLP Assessment Results: Other (Comment)  Prognosis: Excellent  Treatment Tolerance: Patient tolerated treatment well  Strengths: Motivation, Family/Caregiver Suppport  Barriers: None  Education Provided: Yes       Plan:  Inpatient/Swing Bed or Outpatient: Outpatient  Treatment/Interventions: Aural rehab  SLP TX Plan: Continue Plan of Care  SLP Plan: Skilled SLP  SLP Frequency: 1x per week  Duration: 6 months  Discussed POC: Patient  Discussed Risks/Benefits: Patient  Patient/Caregiver Agreeable: Yes  SLP - OK to Discharge: Yes      Subjective   Current Problem:  MODERATE-SEVERE Auditory Skills Deficits impacting communication    Most Recent Visit:  SLP Received On: 03/18/24    General Visit Information:   Past Medical History Relevant to Rehab: Hearing Loss  Patient Seen During This Visit: Yes  Arrival: Independent  Certification Period Start Date: 03/11/24  Certification Period End Date: 06/11/24  Number of Authorized Treatments : Unlimited due to Medical Necessity  Total Number of Visits : 2      Pain Assessment:   Pain Assessment: 0-10  Pain Score: 0 - No pain      Objective     Hearing:  Hearing Interventions: Provided  Hearing Comments: Continue Plan of Care    Long Term Goal:  Comprehend conversation with 80% accuracy in 6 months    Short Term Goals:  Identify consonants varying in manner, voicing, or place cues with 90% accuracy in 3 months  Date Initiated: 3/11/2024  PROGRESS:    The [employer] Word Success Practice exercises was administered and the patient  scored as follows:  Level 8 (consonant place in initial position): 75%  Significant improvement in identification of consonant place cues in initial position.  Gave cues to say word choices aloud to compare sound from live voice to recorded voice.  STATUS:  CONTINUE goal; making anticipated progress    Comprehend sentences with no visual cues with 90% accuracy in 3 months  Date Initiated: 3/11/2024  PROGRESS:  In a story, patient was able to repeat sentences with 40% accuracy, increasing to 80% accuracy with 1 repetition.  Used strategies of acoustic highlighting and providing context clues to help patient with identification of sentences.  STATUS:  CONTINUE goal; making anticipated progress    Comprehend paragraph with no visual cues with 90% accuracy in 5 months  Date Initiated: 3/11/2024  PROGRESS: DID NOT TARGET  STATUS:  CONTINUE goal; making anticipated progress    HOME PROGRAM:  Brown, Polly with [employer]    Outpatient Education:  Adult Outpatient Education  Individual(s) Educated: Patient  Written Education : Home Program  Verbal Education : Results of Testing, Prognosis, home program  Diagnosis and Precautions: SEVERE Auditory Skill Deficits Impacting Communication  Risk and Benefits Discussed with Patient/Caregiver/Other: yes  Patient/Caregiver Demonstrated Understanding: yes  Plan of Care Discussed and Agreed Upon: yes  Patient Response to Education: Patient/Caregiver Verbalized Understanding of Information  Education Comment: Answered questions about Cochlear Implants, Aural Rehab, and Cochlear Implant Expectations      Consultations/Referrals/Coordination of Services: Follow up with ENT and Audiology as recommended.

## 2024-03-21 ENCOUNTER — APPOINTMENT (OUTPATIENT)
Dept: AUDIOLOGY | Facility: CLINIC | Age: 79
End: 2024-03-21
Payer: MEDICARE

## 2024-04-01 ENCOUNTER — TREATMENT (OUTPATIENT)
Dept: SPEECH THERAPY | Facility: CLINIC | Age: 79
End: 2024-04-01
Payer: MEDICARE

## 2024-04-01 DIAGNOSIS — R48.8 OTHER SYMBOLIC DYSFUNCTIONS: ICD-10-CM

## 2024-04-01 DIAGNOSIS — H90.3 SENSORINEURAL HEARING LOSS (SNHL) OF BOTH EARS: Primary | ICD-10-CM

## 2024-04-01 PROCEDURE — 92507 TX SP LANG VOICE COMM INDIV: CPT | Mod: GN

## 2024-04-01 ASSESSMENT — PAIN - FUNCTIONAL ASSESSMENT: PAIN_FUNCTIONAL_ASSESSMENT: 0-10

## 2024-04-01 ASSESSMENT — PAIN SCALES - GENERAL: PAINLEVEL_OUTOF10: 0 - NO PAIN

## 2024-04-01 NOTE — PROGRESS NOTES
Speech-Language Pathology    SLP Adult Outpatient Speech-Language Pathology Treatment     Patient Name: Neymar Hannon  MRN: [id number]  Today's Date: 4/1/2024  Start Time: 0730  End Time: 0815   TOTAL TIME: 45 minutes      Current Problem:   1. Sensorineural hearing loss (SNHL) of both ears        2. Other symbolic dysfunctions              SLP Assessment:  SLP TX Intervention Outcome: Making Progress Towards Goals  SLP Assessment Results: Other (Comment)  Prognosis: Excellent  Treatment Tolerance: Patient tolerated treatment well  Strengths: Motivation, Family/Caregiver Suppport  Barriers: None  Education Provided: Yes        Plan:  Inpatient/Swing Bed or Outpatient: Outpatient  Treatment/Interventions: Aural rehab  SLP TX Plan: Continue Plan of Care  SLP Plan: Skilled SLP  SLP Frequency: 1x per week  Duration: 6 months  Discussed POC: Patient  Discussed Risks/Benefits: Patient  Patient/Caregiver Agreeable: Yes  SLP - OK to Discharge: Yes      Subjective   Current Problem:  MODERATE-SEVERE Auditory Skills Deficits impacting communication    Patient with challenges on recent vacation with hearing in noise and challenges hearing in car.  Patient completed home program but found the target challenging to hear when streaming to his CI.  Attempted with speaker to check sound quality and quality sounded fine- patient also reported substantial improvement with speaker.  When resumed streaming, sound like a beep again instead of a word.  Changing home program back to [employer].    Most Recent Visit:  4/1/2024     General Visit Information:    Past Medical History Relevant to Rehab: Hearing Loss  Patient Seen During This Visit: Yes  Arrival: Independent  Certification Period Start Date: 03/11/24  Certification Period End Date: 06/11/24  Number of Authorized Treatments : Unlimited due to Medical Necessity  Total Number of Visits : 3       Pain Assessment:   Pain Assessment: 0-10  Pain Score: 0 - No pain        Objective      Hearing:  Hearing Interventions: Provided  Hearing Comments: Continue Plan of Care     Long Term Goal:  Comprehend conversation with 80% accuracy in 6 months    Short Term Goals:  Identify consonants varying in manner, voicing, or place cues with 90% accuracy in 3 months  Date Initiated: 3/11/2024  PROGRESS:  DID NOT TARGET  STATUS:  CONTINUE goal; making anticipated progress    Comprehend sentences with no visual cues with 90% accuracy in 3 months  Date Initiated: 3/11/2024  PROGRESS:  In a story, patient was able to repeat sentences with 80% accuracy, increasing to 90% accuracy with 1 repetition.  Used strategies of acoustic highlighting and providing context clues to help patient with identification of sentences.  STATUS:  CONTINUE goal; making anticipated progress    Comprehend paragraph with no visual cues with 90% accuracy in 5 months  Date Initiated: 3/11/2024  PROGRESS: DID NOT TARGET  STATUS:  CONTINUE goal; making anticipated progress    Comprehend sentences in noise with no visual cues with 90% accuracy in 3 months  Date Initiated: 4/3/2024  PROGRESS:  Comprehended sentence in mild-omderate noise with 40% accuracy.  STATUS:  CONTINUE goal; making anticipated progress    HOME PROGRAM:  [employer], Telephone with [facility]    Outpatient Education:   Adult Outpatient Education  Individual(s) Educated: Patient  Written Education : Home Program  Verbal Education : Results of Testing, Prognosis, home program  Diagnosis and Precautions: SEVERE Auditory Skill Deficits Impacting Communication  Risk and Benefits Discussed with Patient/Caregiver/Other: yes  Patient/Caregiver Demonstrated Understanding: yes  Plan of Care Discussed and Agreed Upon: yes  Patient Response to Education: Patient/Caregiver Verbalized Understanding of Information  Education Comment: Answered questions about Cochlear Implants, Aural Rehab, and Cochlear Implant Expectations      Consultations/Referrals/Coordination of Services: Follow  up with ENT and Audiology as recommended.

## 2024-04-03 ENCOUNTER — CLINICAL SUPPORT (OUTPATIENT)
Dept: AUDIOLOGY | Facility: CLINIC | Age: 79
End: 2024-04-03
Payer: MEDICARE

## 2024-04-03 DIAGNOSIS — H90.3 ASYMMETRICAL SENSORINEURAL HEARING LOSS: Primary | ICD-10-CM

## 2024-04-03 PROCEDURE — 92603 COCHLEAR IMPLT F/UP EXAM 7/>: CPT

## 2024-04-03 PROCEDURE — 92557 COMPREHENSIVE HEARING TEST: CPT | Mod: 59

## 2024-04-03 PROCEDURE — 92567 TYMPANOMETRY: CPT | Mod: 59,RT

## 2024-04-03 PROCEDURE — 92626 EVAL AUD FUNCJ 1ST HOUR: CPT | Mod: 59

## 2024-04-03 NOTE — PROGRESS NOTES
ADULT COCHLEAR IMPLANT 3RD STIMULATION    Clinical Indication: sensorineural hearing loss    Internal Device:   Surgeon: Janusz  Surgery Date: 1/26/2024  Processor:  N8 w/ EAS  Audiologist: Kameron  Activation Date: 2/20/2024  Ear: Right    Serial Number:  [id number]  Magnet Strength: 2i, monitor. Ordered a 1i to be mailed to his house  Color: Beige    HISTORY:  Neymar was seen for the 3rd stimulation . He arrives reporting an echo when he listening with her cochlear implant, especially when he changes the volume. He reports he is hearing better but that he still hears an echo over sounds/speech. His voice no longer sounds like he is in a barrel.      Position Program Description   1 C-5 SCAN   2 Psyhcmap EAS SCAN   3 C+5 EAS SCAN   4 C+10 EAS SCAN       PROGRAM PARAMETERS:  Programming was completed using the external processor.  Impedances were within normal limits. Data logging indicate about 14.1 hours. Programming was completed based on the patient's perception of the echo and 10 point loudness scale with comfortable loud being the target volume. We discussed in depth realistic expectations and that the echo he is hearing is likely normal and that the more he wears it, pushed the volume to comfortable loud, and practices as he has been the echo should reduce with time. He is still exceeding expectations of his cochlear progression.      AUDIOLOGIC EVALUATION    OTOSCOPY  Otoscopic inspection revealed clear canals and visualization of the eardrum bilaterally.    IMMITTANCE  Normal obtained in the right ear, consistent with a normal moving eardrums.    AUDIOMETRIC TESTING  Pure tone audiometry conducted via insert headphones from 125 Hz - 8000 Hz with good reliability was consistent with:  Right ear: moderate to profound sensorineural hearing loss  Left ear: mild to severe mixed hearing loss    QUALITY OF LIFE (QOL) SURVEY  QOL 35:                    Raw Score                         Converted  Score  Communication              34                                            52.84  Emotional                       14                                            48.21  Entertainment                 11                                            32.96  Environment                   14                                            41.53  Listening Effort                10                                            28.29       Social                               18                                            60.38  Global                              30                                            46.35    FUNCTIONAL TESTING  Responses were in the near normal mild range when listening to warble tones via [employer] speaker from 250 HZ - 6000 Hz  SRT: 30 dBHL  CNC @ 50 dB HL: 60%  AzBio at 50 dB HL: 69%    PATIENT EDUCATION:   Discussed results and recommendations with patient.  Questions were addressed and the patient was encouraged to contact our department should concerns arise.    Follow up as planned on 7/16/2024  Contact with questions and concerns       Nba Goldsmith CCC-A, [facility]  Licensed Audiologist      ACTIVATION QUESTIONS  Did the patient meet with the [employer]  prior to surgery?   Did the patient find this meeting helpful? N/A  Would the patient recommend this meeting to a family member or friend who is considering a cochlear implant? N/A  Was the patient more prepared for CI programming/activation due to meeting with the [employer] ? N/A.     Time spent with patient: one hour and 45 minutes    Completed by:  Nba Goldsmith CCC-A, [facility]  Licensed Audiologist

## 2024-04-15 ENCOUNTER — TREATMENT (OUTPATIENT)
Dept: SPEECH THERAPY | Facility: CLINIC | Age: 79
End: 2024-04-15
Payer: MEDICARE

## 2024-04-15 DIAGNOSIS — H90.3 SENSORINEURAL HEARING LOSS (SNHL) OF BOTH EARS: Primary | ICD-10-CM

## 2024-04-15 DIAGNOSIS — R48.8 OTHER SYMBOLIC DYSFUNCTIONS: ICD-10-CM

## 2024-04-15 PROCEDURE — 92507 TX SP LANG VOICE COMM INDIV: CPT | Mod: GN

## 2024-04-15 ASSESSMENT — PAIN - FUNCTIONAL ASSESSMENT: PAIN_FUNCTIONAL_ASSESSMENT: 0-10

## 2024-04-15 ASSESSMENT — PAIN SCALES - GENERAL: PAINLEVEL_OUTOF10: 0 - NO PAIN

## 2024-04-15 NOTE — PROGRESS NOTES
Speech-Language Pathology    SLP Adult Outpatient Speech-Language Pathology Treatment     Patient Name: Neymar Hanonn  MRN: [id number]  Time Calculation  Start Time: 0733  Stop Time: 0825  Time Calculation (min): 52 min      Current Problem:   1. Sensorineural hearing loss (SNHL) of both ears        2. Other symbolic dysfunctions              SLP Assessment:  SLP TX Intervention Outcome: Making Progress Towards Goals  SLP Assessment Results: Other (Comment)  Prognosis: Excellent  Treatment Tolerance: Patient tolerated treatment well  Strengths: Motivation, Family/Caregiver Suppport  Barriers: None  Education Provided: Yes       Plan:  Inpatient/Swing Bed or Outpatient: Outpatient  Treatment/Interventions: Aural rehab  SLP TX Plan: Continue Plan of Care  SLP Plan: Skilled SLP  SLP Frequency: 1x per week  Duration: 6 months  Discussed POC: Patient  Discussed Risks/Benefits: Patient  Patient/Caregiver Agreeable: Yes  SLP - OK to Discharge: Yes      Subjective   Current Problem:  MODERATE-SEVERE Auditory Skills Deficits impacting communication    Patient with challenges in groups- unable to participate in group settings due to communication challenges. Did do home program with success.    Most Recent Visit:  SLP Received On: 04/15/24    General Visit Information:  Past Medical History Relevant to Rehab: Hearing Loss  Patient Seen During This Visit: Yes  Arrival: Independent  Certification Period Start Date: 03/11/24  Certification Period End Date: 06/11/24  Number of Authorized Treatments : Unlimited due to Medical Necessity  Total Number of Visits : 4      Pain Assessment:   Pain Assessment: 0-10  Pain Score: 0 - No pain      Objective     Hearing:  Hearing Interventions: Provided  Hearing Comments: Continue Plan of Care    Long Term Goal:  Comprehend conversation with 80% accuracy in 6 months    Short Term Goals:  Identify consonants varying in manner, voicing, or place cues with 90% accuracy in 3 months  Date  Initiated: 3/11/2024  PROGRESS:  DID NOT TARGET  STATUS:  CONTINUE goal; making anticipated progress    Comprehend sentences with no visual cues with 90% accuracy in 3 months  Date Initiated: 3/11/2024  PROGRESS:  In a story, patient was able to repeat sentences with 90% accuracy, increasing to 100% accuracy with 1 repetition.  Used strategies of acoustic highlighting and providing context clues to help patient with identification of sentences.  STATUS:  CONTINUE goal; making anticipated progress    Comprehend paragraph with no visual cues with 90% accuracy in 5 months  Date Initiated: 3/11/2024  PROGRESS: Comprehended three sentences in quiet with 80% accuracy.  STATUS:  CONTINUE goal; making anticipated progress    Comprehend sentences in noise with no visual cues with 90% accuracy in 3 months  Date Initiated: 4/3/2024  PROGRESS:  DID NOT TARGET  STATUS:  CONTINUE goal; making anticipated progress    HOME PROGRAM:  Audiobooks with accents, Telephone with [facility]    Outpatient Education:  Adult Outpatient Education  Individual(s) Educated: Patient  Written Education : Home Program  Verbal Education : Results of Testing, Prognosis, home program  Diagnosis and Precautions: SEVERE Auditory Skill Deficits Impacting Communication  Risk and Benefits Discussed with Patient/Caregiver/Other: yes  Patient/Caregiver Demonstrated Understanding: yes  Plan of Care Discussed and Agreed Upon: yes  Patient Response to Education: Patient/Caregiver Verbalized Understanding of Information  Education Comment: Answered questions about Cochlear Implants, Aural Rehab, and Cochlear Implant Expectations    Consultations/Referrals/Coordination of Services: Follow up with ENT and Audiology as recommended.

## 2024-05-06 ENCOUNTER — TREATMENT (OUTPATIENT)
Dept: SPEECH THERAPY | Facility: CLINIC | Age: 79
End: 2024-05-06
Payer: MEDICARE

## 2024-05-06 DIAGNOSIS — H90.3 SENSORINEURAL HEARING LOSS (SNHL) OF BOTH EARS: Primary | ICD-10-CM

## 2024-05-06 DIAGNOSIS — R48.8 OTHER SYMBOLIC DYSFUNCTIONS: ICD-10-CM

## 2024-05-06 PROCEDURE — 92507 TX SP LANG VOICE COMM INDIV: CPT | Mod: GN

## 2024-05-06 ASSESSMENT — PAIN SCALES - GENERAL: PAINLEVEL_OUTOF10: 0 - NO PAIN

## 2024-05-06 ASSESSMENT — PAIN - FUNCTIONAL ASSESSMENT: PAIN_FUNCTIONAL_ASSESSMENT: 0-10

## 2024-05-06 NOTE — PROGRESS NOTES
Speech-Language Pathology    SLP Adult Outpatient Speech-Language Pathology Treatment     Patient Name: Neymar Hannon  MRN: [id number]  Time Calculation  Start Time: 0730  Stop Time: 0815  Time Calculation (min): 45 min      Current Problem:   1. Sensorineural hearing loss (SNHL) of both ears        2. Other symbolic dysfunctions              SLP Assessment:  SLP TX Intervention Outcome: Making Progress Towards Goals  SLP Assessment Results: Other (Comment)  Prognosis: Excellent  Treatment Tolerance: Patient tolerated treatment well  Strengths: Motivation, Family/Caregiver Suppport  Barriers: None  Education Provided: Yes       Plan:  Inpatient/Swing Bed or Outpatient: Outpatient  Treatment/Interventions: Aural rehab  SLP TX Plan: Continue Plan of Care  SLP Plan: Skilled SLP  SLP Frequency: 1x per week  Duration: 6 months  Discussed POC: Patient  Discussed Risks/Benefits: Patient  Patient/Caregiver Agreeable: Yes  SLP - OK to Discharge: Yes      Subjective   Current Problem:  MODERATE-SEVERE Auditory Skills Deficits impacting communication    Patient with challenges in groups and feels echo is worse in past week (therapist listened to microphones with scratchy /a/ quality and reached out to audiology).  Inconsistently hearing wife's voice.  Some instances of doing well in restaurant with some people and then struggling more when at their homes.    General Visit Information:  Past Medical History Relevant to Rehab: Hearing Loss  Patient Seen During This Visit: Yes  Arrival: Independent  Certification Period Start Date: 03/11/24  Certification Period End Date: 06/11/24  Number of Authorized Treatments : Unlimited due to Medical Necessity  Total Number of Visits : 5      Pain Assessment:   Pain Assessment: 0-10  Pain Score: 0 - No pain      Objective     Hearing:  Hearing Interventions: Provided  Hearing Comments: Continue Plan of Care    Long Term Goal:  Comprehend conversation with 80% accuracy in 6 months    Short  Term Goals:  Identify consonants varying in manner, voicing, or place cues with 90% accuracy in 3 months  Date Initiated: 3/11/2024  PROGRESS:  DID NOT TARGET  STATUS:  CONTINUE goal; making anticipated progress    Comprehend sentences with no visual cues with 90% accuracy in 3 months  Date Initiated: 3/11/2024  PROGRESS:  In a story, patient was able to repeat sentences with 100% accuracy, Achieved goal.  STATUS:  ACHIEVED goal.    Comprehend paragraph with no visual cues with 90% accuracy in 5 months  Date Initiated: 3/11/2024  PROGRESS: DID NOT TARGET  STATUS:  CONTINUE goal; making anticipated progress    Comprehend sentences in noise with no visual cues with 90% accuracy in 3 months  Date Initiated: 4/3/2024  PROGRESS:  Comprehended sentences in moderate noise with 70% accuracy.  STATUS:  CONTINUE goal; making anticipated progress    HOME PROGRAM:  Audiobooks with noise marga; Telephone with [facility]    Outpatient Education:  Adult Outpatient Education  Individual(s) Educated: Patient  Written Education : Home Program  Verbal Education : Results of Testing, Prognosis, home program  Diagnosis and Precautions: SEVERE Auditory Skill Deficits Impacting Communication  Risk and Benefits Discussed with Patient/Caregiver/Other: yes  Patient/Caregiver Demonstrated Understanding: yes  Plan of Care Discussed and Agreed Upon: yes  Patient Response to Education: Patient/Caregiver Verbalized Understanding of Information  Education Comment: Answered questions about Cochlear Implants, Aural Rehab, and Cochlear Implant Expectations    Consultations/Referrals/Coordination of Services: Follow up with ENT and Audiology as recommended.

## 2024-05-07 ENCOUNTER — OFFICE VISIT (OUTPATIENT)
Dept: OTOLARYNGOLOGY | Facility: CLINIC | Age: 79
End: 2024-05-07
Payer: MEDICARE

## 2024-05-07 VITALS — BODY MASS INDEX: 27.92 KG/M2 | WEIGHT: 195 LBS | HEIGHT: 70 IN

## 2024-05-07 DIAGNOSIS — H90.3 SENSORINEURAL HEARING LOSS (SNHL) OF BOTH EARS: Primary | ICD-10-CM

## 2024-05-07 DIAGNOSIS — Z96.21 COCHLEAR IMPLANT IN PLACE: ICD-10-CM

## 2024-05-07 PROCEDURE — 99213 OFFICE O/P EST LOW 20 MIN: CPT | Performed by: OTOLARYNGOLOGY

## 2024-05-07 PROCEDURE — 1160F RVW MEDS BY RX/DR IN RCRD: CPT | Performed by: OTOLARYNGOLOGY

## 2024-05-07 PROCEDURE — 1159F MED LIST DOCD IN RCRD: CPT | Performed by: OTOLARYNGOLOGY

## 2024-05-07 ASSESSMENT — PATIENT HEALTH QUESTIONNAIRE - PHQ9
SUM OF ALL RESPONSES TO PHQ9 QUESTIONS 1 AND 2: 0
1. LITTLE INTEREST OR PLEASURE IN DOING THINGS: NOT AT ALL
2. FEELING DOWN, DEPRESSED OR HOPELESS: NOT AT ALL

## 2024-05-07 NOTE — LETTER
May 10, 2024     Tj Graves MD  [address] [facility] [address]    Patient: Neymar Hannon   YOB: 1945   Date of Visit: 5/7/2024       Dear Dr. Tj Graves MD:    I had the pleasure of seeing your patient, Neymar Hannon today. Below are my notes for this consultation.  If you have questions, please do not hesitate to call me. Thank you for allowing me to participate in the care of your patient.        Sincerely,     Mary Kay Boudreaux MD      CC: No Recipients  _____________________________________________________________________________    78 y.o. male with a history of bilateral sensorineural hearing loss and bilateral tinnitus, he is s/p right-sided cochlear implant on 1/26/24. Healing appropriately today. Early pressure-related injury apparent on posterior surface of right juliana related to sound processor. Advised patient to fit mole-skin to said processor to alleviate this. He will see his audiologist again soon for assistance with managing his CI setting given the recent echoes he has been hearing. Otherwise he is satisfied with his post-operative course. As he does not feel as much benefit from his left hearing aid as he did in the past, advised patient that left CI may be appropriate as well in the future after he is satisfied with management of his current CI. He will follow up with us again in a year for re-evaluation or sooner as needed.    Corby Boudreaux DO, PGY3

## 2024-05-07 NOTE — PATIENT INSTRUCTIONS
Welcome to Dr. Boudreaux's clinic. We are here to assist you through your ENT care at [facility]. Dr. Boudreaux is an Ear surgeon. This means that she specializes in taking care of patients with complex ear problems.     Dr. Boudreaux's office number is [phone]. While you may see her at a satellite office, she has a team committed to help meet your healthcare needs at [facility]'s main [place]. This number is the most direct way to communicate with the office.     Ludmila is Dr. Boudreaux's  and she answers the office phone from 8am-4pm Mon-Fri. She can help you with many general questions and information. Questions that she cannot answer will be directed to the appropriate staff. You may need to leave a message. In this case, someone from the team will call you back.     Prem Llamas RN, is Dr. Boudreaux's primary nurse and can be reached by calling the office. Prem is in clinic with Dr. Boudreaux's on Mondays and Tuesdays. Non-urgent calls will be returned on non-clinic days typically Thursdays.     Sometimes, other team members will also be involved in your care. These people may include dieticians, social workers, speech therapists, audiologist, neurologist, and physical therapist. Dr. Boudreaux will provide these referrals as needed. Please let her know if you would like to request a specific referral.     For your convenience, Dr. Boudreaux sees patients at several [facility] locations including [facility] and [facility] at the main campus of [facility]. While we try to make your appointments as convenient as possible, occasionally a visit to another location may be necessary to provide the best care for you. We look forward to working with you to meet your healthcare goals.     Dr. Boudreaux makes every effort to run on time for your appointments. Therefore, if you are more than 30 minutes late unrelated to a scan or another appointment such therapy or audio you will have to  reschedule.

## 2024-05-07 NOTE — PROGRESS NOTES
History Of Present Illness:  Neymar Hannon is a 78 y.o. male with a history of bilateral sensorineural hearing loss and bilateral tinnitus, he is s/p right-sided cochlear implant on 1/26/24.     Recall 2/7/24:  77 yo with h/o bilateral SNHL. S/p right CI 10 days ago. On Friday he experienced severe dizziness and n/v. Went to the ER and was observed at [facility] for 2 days. He reports that he is feeling back to baseline with the exception that he feels that his left hearing is down.     Interval history:  Since his last visit, patient denies any surgical related issues. No vertigo since his last episode jacky-operatively. He does however complain of right auricular discomfort towards the end of the day localized around where the sound processor abuts his juliana. Also notes over the past 10 days has been hearing echoes related to his cochlear implant. He also feels that his left hearing aid is not providing him as much benefit as it had in the past. Has not tried mole-skin to processor yet.    Surgical History:  He has a past surgical history that includes Other surgical history (Left, 12/10/2019); Tonsillectomy; Adenoidectomy; and Colonoscopy.     Allergies:  Amlodipine-olmesartan    Medications:   Current Outpatient Medications   Medication Instructions    esomeprazole (NexIUM) 20 mg DR capsule 1 capsule, oral, Daily    lisinopril 40 mg, oral, Daily    simvastatin (ZOCOR) 40 mg, oral      Review of Systems:   A comprehensive 10-point review of systems was obtained including constitutional, neurological, HEENT, pulmonary, cardiovascular, genito-urinary, and other pertinent systems and was negative except as noted in the HPI.      Physical Exam:  Constitutional   General appearance: Healthy-appearing, well-nourished, well groomed, in no acute distress.   Ability to communicate: Normal communication without aids, normal voice quality.       Head and face: Atraumatic with no masses, lesions, or scarring.   Facial strength:  Normal strength and symmetry, no synkinesis or facial tic.     Ears  Otoscopic examination: well healed postauricular incision on right clean dry and intact, approximately 0.5cm area of flat erythema without excoriation, drainage, or other abnormality on posterior surface of juliana where processor abuts the auricle, Bilateral normal otoscopy of the tympanic membrane     Nose: Dorsum symmetric with no visible or palpable deformities.     Oral Cavity/Mouth  Lips, teeth, and gums: Normal lips, gums, and dentition.     Oropharynx: Mucosa moist, no lesions.     Neck: Symmetrical, trachea midline. No masses visible.     Neurological/Psychiatric  Cranial Nerve Examination: II - XII grossly intact.  Orientation to person, place, and time: Normal.  Mood and affect: Normal.     Skin: Normal without rashes or lesions.     Pulmonary  Respiratory effort: Chest expands symmetrically.     Cardiovascular: Good peripheral pulses  Peripheral vascular system: No varicosities, carotid pulse normal, no edema. No jugular venous distension.     Extremities: Appearance of extremities: Normal. Gait normal.     Last Recorded Vitals:  There were no vitals taken for this visit.       Assessment/Plan   78 y.o. male with a history of bilateral sensorineural hearing loss and bilateral tinnitus, he is s/p right-sided cochlear implant on 1/26/24. Healing appropriately today. Early pressure-related injury apparent on posterior surface of right juliana related to sound processor. Advised patient to fit mole-skin to said processor to alleviate this. He will see his audiologist again soon for assistance with managing his CI setting given the recent echoes he has been hearing. Otherwise he is satisfied with his post-operative course. As he does not feel as much benefit from his left hearing aid as he did in the past, advised patient that left CI may be appropriate as well in the future after he is satisfied with management of his current CI. He will  follow up with us again in a year for re-evaluation or sooner as needed.    Corby Boudreaux DO, PGY3     I saw and evaluated the patient. I personally obtained the key and critical portions of the history and physical exam or was physically present for key and critical portions performed by the resident/fellow. I reviewed the resident/fellow's documentation and discussed the patient with the resident/fellow. I agree with the resident/fellow's medical decision making as documented in the note.    Mary Kay Boudreaux MD

## 2024-05-10 PROBLEM — Z98.890 POST-OPERATIVE STATE: Status: ACTIVE | Noted: 2024-05-10

## 2024-05-13 DIAGNOSIS — Z79.2 PROPHYLACTIC ANTIBIOTIC: Primary | ICD-10-CM

## 2024-05-13 RX ORDER — AMOXICILLIN 500 MG/1
2000 TABLET, FILM COATED ORAL ONCE
Qty: 4 TABLET | Refills: 0 | Status: SHIPPED | OUTPATIENT
Start: 2024-05-13 | End: 2024-05-13

## 2024-05-15 ENCOUNTER — APPOINTMENT (OUTPATIENT)
Dept: AUDIOLOGY | Facility: CLINIC | Age: 79
End: 2024-05-15
Payer: MEDICARE

## 2024-06-03 ENCOUNTER — TREATMENT (OUTPATIENT)
Dept: SPEECH THERAPY | Facility: CLINIC | Age: 79
End: 2024-06-03
Payer: MEDICARE

## 2024-06-03 DIAGNOSIS — H90.3 SENSORINEURAL HEARING LOSS (SNHL) OF BOTH EARS: Primary | ICD-10-CM

## 2024-06-03 DIAGNOSIS — R48.8 OTHER SYMBOLIC DYSFUNCTIONS: ICD-10-CM

## 2024-06-03 PROCEDURE — 92507 TX SP LANG VOICE COMM INDIV: CPT | Mod: GN

## 2024-06-03 ASSESSMENT — PAIN SCALES - GENERAL: PAINLEVEL_OUTOF10: 0 - NO PAIN

## 2024-06-03 ASSESSMENT — ENCOUNTER SYMPTOMS
LOSS OF SENSATION IN FEET: 0
DEPRESSION: 0
OCCASIONAL FEELINGS OF UNSTEADINESS: 0

## 2024-06-03 ASSESSMENT — PAIN - FUNCTIONAL ASSESSMENT: PAIN_FUNCTIONAL_ASSESSMENT: 0-10

## 2024-06-03 NOTE — PROGRESS NOTES
"Speech-Language Pathology    SLP Adult Outpatient Speech-Language Pathology Treatment     Patient Name: Neymar Hannon  MRN: [id number]  Time Calculation  Start Time: 0732  Stop Time: 0815  Time Calculation (min): 43 min      Current Problem:   1. Sensorineural hearing loss (SNHL) of both ears        2. Other symbolic dysfunctions              SLP Assessment:  SLP TX Intervention Outcome: Making Progress Towards Goals  SLP Assessment Results: Other (Comment)  Prognosis: Excellent  Treatment Tolerance: Patient tolerated treatment well  Barriers: None  Education Provided: Yes       Plan:  Inpatient/Swing Bed or Outpatient: Outpatient  Treatment/Interventions: Aural rehab  SLP TX Plan: Continue Plan of Care  SLP Plan: Skilled SLP  SLP Frequency: 1x per week  Duration: 6 months  Discussed POC: Patient  Discussed Risks/Benefits: Patient  Patient/Caregiver Agreeable: Yes  SLP - OK to Discharge: Yes      Subjective   Current Problem:  MODERATE-SEVERE Auditory Skills Deficits impacting communication    Patient with challenges in groups and feels echo is worse in past week (therapist listened to microphones with scratchy /a/ quality and reached out to audiology).  Inconsistently hearing wife's voice.  Some instances of doing well in restaurant with some people and then struggling more when at their homes.    General Visit Information:  Past Medical History Relevant to Rehab: Hearing Loss  Patient Seen During This Visit: Yes  Arrival: Independent  Certification Period Start Date: 03/11/24  Certification Period End Date: 06/11/24  Number of Authorized Treatments : 5 (from 6/3/24-8/1/2024)  Total Number of Visits : 6 ((1 in new auth))      Pain Assessment:   Pain Assessment: 0-10  Pain Score: 0 - No pain      Objective     Hearing:  Hearing Interventions: Provided  Hearing Comments: Continue Plan of Care    Patient with continued challenges with echo and  \"good/bad hearing days.\"  Reports completion of home program.  Provided " "education about captioning marga \"[email or web address]\" to help caption live conversations.    Long Term Goal:  Comprehend conversation with 80% accuracy in 6 months    Short Term Goals:  Identify consonants varying in manner, voicing, or place cues with 90% accuracy in 3 months  Date Initiated: 3/11/2024  PROGRESS:    The [employer] Word Success Listening Test was administered and the patient scored as follows:    Level 1 (words varying by syllable length): 100%  Level 2 (different consonants and vowels): 100%  Level 3 (different vowels): 100%  Level 4 (consonant manner in initial position): %80  Level 5 (consonant manner in final position): 0%  Level 6 (consonant voicing in final position): 60%  Level 7 (consonant voicing in initial position): 40%  Level 8 (consonant place in initial position): 60%  Level 9 (consonant place in final position): 60%  Some increased challenge especially with manner and voicing not experienced earlier.  Updated home program to target these areas and also worked with audiology to push up his mapping appointment since would not anticipate this area to become more challenging.  STATUS:  CONTINUE goal; making anticipated progress      Comprehend paragraph with no visual cues with 90% accuracy in 5 months  Date Initiated: 3/11/2024  PROGRESS: DID NOT TARGET  STATUS:  CONTINUE goal; making anticipated progress    Comprehend sentences in noise with no visual cues with 90% accuracy in 3 months  Date Initiated: 4/3/2024  PROGRESS:  DID NOT TARGET  STATUS:  CONTINUE goal; making anticipated progress    HOME PROGRAM:  Audiobooks with noise marga; Telephone with Confidence; Word Success levels  4, 5, and 7    Outpatient Education:  Adult Outpatient Education  Individual(s) Educated: Patient  Written Education : Home Program  Verbal Education : Results of Testing, Prognosis, home program  Diagnosis and Precautions: SEVERE Auditory Skill Deficits Impacting Communication  Risk and Benefits Discussed with " Patient/Caregiver/Other: yes  Patient/Caregiver Demonstrated Understanding: yes  Plan of Care Discussed and Agreed Upon: yes  Patient Response to Education: Patient/Caregiver Verbalized Understanding of Information  Education Comment: Answered questions about Cochlear Implants, Aural Rehab, and Cochlear Implant Expectations    Consultations/Referrals/Coordination of Services: Follow up with ENT and Audiology as recommended.

## 2024-06-25 ENCOUNTER — CLINICAL SUPPORT (OUTPATIENT)
Dept: AUDIOLOGY | Facility: CLINIC | Age: 79
End: 2024-06-25
Payer: MEDICARE

## 2024-06-25 DIAGNOSIS — H90.3 SENSORINEURAL HEARING LOSS, BILATERAL: Primary | ICD-10-CM

## 2024-06-25 DIAGNOSIS — Z96.21 COCHLEAR IMPLANT IN PLACE: ICD-10-CM

## 2024-06-25 PROCEDURE — 92626 EVAL AUD FUNCJ 1ST HOUR: CPT | Mod: 59

## 2024-06-25 PROCEDURE — 92603 COCHLEAR IMPLT F/UP EXAM 7/>: CPT

## 2024-06-25 NOTE — PROGRESS NOTES
ADULT COCHLEAR IMPLANT 3RD STIMULATION    Clinical Indication: sensorineural hearing loss    Internal Device:   Surgeon: Janusz  Surgery Date: 1/26/2024  Processor:  N8 w/ EAS  Audiologist: Kameron  Activation Date: 2/20/2024  Ear: Right    Serial Number:  [id number]  Magnet Strength: 2i, monitor. Ordered a 1i to be mailed to his house  Color: Beige    HISTORY:  Neymar was seen for the 4th stimulation . He arrives reporting his sound quality has slowly been declining. His voice no longer sounds like he is in a barrel.      Position Program Description   1 Psyhcmap EAS SCAN   2 Psyhcmap EAS Restaurant    3 Psyhcmap EAS Outdoor    4 Psychmap without EAS SCAN     Inspection of the equipment indicated a non functioning EAS .  Patient did not present sound of MAP w/o EAS     PROGRAM PARAMETERS:  Programming was completed using the external processor.  Impedances were within normal limits. Data logging indicate about 14.6 hours. Programming was completed based on the patient's perception of the echo and 10 point loudness scale with comfortable loud being the target volume. We discussed in depth realistic expectations and that the echo he is hearing is likely normal and that the more he wears it, pushed the volume to comfortable loud, and practices as he has been the echo should reduce with time. He is still exceeding expectations of his cochlear progression.      AUDIOLOGIC EVALUATION    OTOSCOPY  Otoscopic inspection revealed clear canals and visualization of the eardrum bilaterally.    IMMITTANCE  Normal obtained in the right ear, consistent with a normal moving eardrums.    AUDIOMETRIC TESTING  Pure tone audiometry conducted via insert headphones from 125 Hz - 8000 Hz with good reliability was consistent with:  Right ear: moderate to profound sensorineural hearing loss    QUALITY OF LIFE (QOL) SURVEY  QOL 35:                    Raw Score                         Converted Score  Communication               34                                            52.84  Emotional                       14                                            48.21  Entertainment                 11                                            32.96  Environment                   14                                            41.53  Listening Effort                10                                            28.29       Social                               18                                            60.38  Global                              30                                            46.35    FUNCTIONAL TESTING    CI w/ EAS ONLY  Responses were in the near normal mild range when listening to warble tones via soudnfield speaker from 250 HZ - 6000 Hz  SRT: 25 dBHL  CNC @ 50 dB HL: 76%  AzBio at 50 dB HL with 40 dB HL of noise: 66%    CI w/ EAS + HA  Responses were in the near normal mild range when listening to warble tones via soudnfield speaker from 250 HZ - 6000 Hz  SRT: 30dBHL  CNC @ 50 dB HL: 61%  AzBio at 50 dB HL with 40 dB HL of noise: 51%    Interestingly, the patient had improved outcomes without his hearing aid. He should start to consider a left cochlear implant pending motivation    PATIENT EDUCATION:   Discussed results and recommendations with patient.  Questions were addressed and the patient was encouraged to contact our department should concerns arise.    Follow up as planned in September  Contact with questions and concerns       Nba Goldsmith CCC-A, [facility]  Licensed Audiologist    Time spent with patient: one hour and 35 minutes    Completed by:  Nba Goldsmith CCC-A, [facility]  Licensed Audiologist

## 2024-07-15 ENCOUNTER — TREATMENT (OUTPATIENT)
Dept: SPEECH THERAPY | Facility: CLINIC | Age: 79
End: 2024-07-15
Payer: MEDICARE

## 2024-07-15 DIAGNOSIS — R48.8 OTHER SYMBOLIC DYSFUNCTIONS: ICD-10-CM

## 2024-07-15 DIAGNOSIS — H90.3 SENSORINEURAL HEARING LOSS (SNHL) OF BOTH EARS: Primary | ICD-10-CM

## 2024-07-15 PROCEDURE — 92507 TX SP LANG VOICE COMM INDIV: CPT | Mod: GN

## 2024-07-15 ASSESSMENT — PAIN SCALES - GENERAL: PAINLEVEL_OUTOF10: 0 - NO PAIN

## 2024-07-15 ASSESSMENT — PAIN - FUNCTIONAL ASSESSMENT: PAIN_FUNCTIONAL_ASSESSMENT: 0-10

## 2024-07-15 NOTE — PROGRESS NOTES
Speech-Language Pathology    SLP Adult Outpatient Speech-Language Pathology Treatment     Patient Name: Neymar Hannon  MRN: [id number]     07/15/24 0730   General   Certification Period Start Date 07/02/24   Certification Period End Date 10/02/24   Number of Authorized Treatments  5   Total Number of Visits  7  ((2 in new auth))        07/15/24 0730   Time Calculation   Start Time 0730   Stop Time 0838   Time Calculation (min) 68 min       Current Problem:   1. Sensorineural hearing loss (SNHL) of both ears        2. Other symbolic dysfunctions              SLP Assessment:  Making Progress towards all goals.  Presenting with Moderate auditory skill deficts in noise impacting communication.  Continued challenge listening in noise.        Plan:  Inpatient/Swing Bed or Outpatient: Outpatient  Treatment/Interventions: Aural rehab  SLP TX Plan: Continue Plan of Care  SLP Plan: Skilled SLP  SLP Frequency: Other (Comment) (1x/month)  Duration: 6 months  Discussed POC: Patient  Discussed Risks/Benefits: Patient  Patient/Caregiver Agreeable: Yes  SLP - OK to Discharge: Yes      Subjective   Current Problem:  MODERATE Auditory Skills Deficits impacting communication    Patient expressed frustration with inconsistency of progress and feeling as though he is not sure what to expect regarding time frames.  Reviewed anticipated time frames and expectations for progress.      Objective     Hearing:       Long Term Goal:  Comprehend conversation with 80% accuracy in 6 months    Short Term Goals:  Identify consonants varying in manner, voicing, or place cues with 90% accuracy in 3 months  Date Initiated: 7/16/2024  PROGRESS:  Continued challenges particularly in place cues, continue goal targeting place cues.  STATUS:  CONTINUE goal; making anticipated progress      Comprehend paragraph with no visual cues with 90% accuracy in 5 months  Date Initiated: 3/11/2024  PROGRESS: Comprehended paragraph with 90% accuracy in quiet.  STATUS:   ACHIEVED goal.    Comprehend sentences in noise with no visual cues with 90% accuracy in 6 months  Date Initiated: 4/3/2024  PROGRESS: Comprehended paragraph with 80% accuracy in environmental noise and 20% and increased challenge in competing speech noise.  STATUS:  CONTINUE goal; making anticipated progress    HOME PROGRAM:  Audiobooks with noise marga; Telephone with Confidence; Word Success levels  4, 5, and 7; Gave live voice practice    Consultations/Referrals/Coordination of Services: Follow up with ENT and Audiology as recommended.

## 2024-07-16 ENCOUNTER — APPOINTMENT (OUTPATIENT)
Dept: AUDIOLOGY | Facility: CLINIC | Age: 79
End: 2024-07-16
Payer: MEDICARE

## 2024-08-19 ENCOUNTER — TREATMENT (OUTPATIENT)
Dept: SPEECH THERAPY | Facility: CLINIC | Age: 79
End: 2024-08-19
Payer: MEDICARE

## 2024-08-19 DIAGNOSIS — H90.3 SENSORINEURAL HEARING LOSS (SNHL) OF BOTH EARS: Primary | ICD-10-CM

## 2024-08-19 DIAGNOSIS — R48.8 OTHER SYMBOLIC DYSFUNCTIONS: ICD-10-CM

## 2024-08-19 PROCEDURE — 92507 TX SP LANG VOICE COMM INDIV: CPT | Mod: GN

## 2024-08-19 ASSESSMENT — PAIN SCALES - GENERAL: PAINLEVEL_OUTOF10: 0 - NO PAIN

## 2024-08-19 ASSESSMENT — PAIN - FUNCTIONAL ASSESSMENT: PAIN_FUNCTIONAL_ASSESSMENT: 0-10

## 2024-08-19 NOTE — PROGRESS NOTES
Speech-Language Pathology    SLP Adult Outpatient Speech-Language Pathology Treatment     Patient Name: Neymar Hannon  MRN: [id number]  Today's Date: 8/19/2024  Time Calculation  Start Time: 0845  Stop Time: 0955  Time Calculation (min): 70 min         Current Problem:   1. Sensorineural hearing loss (SNHL) of both ears        2. Other symbolic dysfunctions            General Visit Information:   Past Medical History Relevant to Rehab: Hearing Loss  Patient Seen During This Visit: Yes  Arrival: Independent  Certification Period Start Date: 07/02/24  Certification Period End Date: 10/02/24  Number of Authorized Treatments : 5  Total Number of Visits : 8      Pain Assessment:   Pain Assessment: 0-10  0-10 (Numeric) Pain Score: 0 - No pain      Objective     Hearing:  Hearing Interventions: Provided  Hearing Comments: Continue Plan of Care    Outpatient Education:  Adult Outpatient Education  Individual(s) Educated: Patient  Written Education : Home Program  Verbal Education : Progress; Home Program  Risk and Benefits Discussed with Patient/Caregiver/Other: yes  Patient/Caregiver Demonstrated Understanding: yes  Plan of Care Discussed and Agreed Upon: yes  Patient Response to Education: Patient/Caregiver Verbalized Understanding of Information    SLP Assessment:  .SLP TX Intervention Outcome: Making Progress Towards Goals  SLP Assessment Results: Other (Comment)  Prognosis: Excellent  Treatment Tolerance: Patient tolerated treatment well  Barriers: None  Education Provided: Yes       Plan:  Inpatient/Swing Bed or Outpatient: Outpatient  Treatment/Interventions: Aural rehab  SLP TX Plan: Continue Plan of Care  SLP Plan: Skilled SLP  SLP Frequency:  (1x/month)  Duration: 6 months  Discussed POC: Patient  Discussed Risks/Benefits: Patient  Patient/Caregiver Agreeable: Yes  SLP - OK to Discharge: Yes      Subjective   Current Problem:  MODERATE Auditory Skills Deficits impacting communication    Patient expressed frustration  with inconsistency of progress and feeling as though he is not sure what to expect regarding time frames and again stated that he is not receiving information regarding expectations.  Written information was provided regarding his expectation questions this session.      Objective     Hearing:  Hearing Interventions: Provided  Hearing Comments: Continue Plan of Care    Long Term Goal:  Comprehend conversation with 80% accuracy in 6 months    Short Term Goals:  Identify consonants varying in manner, voicing, or place cues with 90% accuracy in 3 months  Date Initiated: 7/16/2024  PROGRESS:  DID NOT TARGET  STATUS:  CONTINUE goal; making anticipated progress      Comprehend sentences in noise with no visual cues with 90% accuracy in 6 months  Date Initiated: 4/3/2024  PROGRESS: Comprehended sentences (jokes) in noise with 0% accuracy in loud environmental noise; increased to 60% accuracy with 1 repetition.  STATUS:  CONTINUE goal; making anticipated progress    HOME PROGRAM:  Audiobooks with noise marga; Telephone with Confidence; Word Success levels  4, 5, and 7; live voice practice    Additional Education:  Educated the patient regarding appropriate expectations regarding what is typical/atypical with cochlear implants.  Written hand out provided to patient for future reference.    Discussion included:  Listening Expectations:    It is most common to hear well in the morning and to have it harder to hear as the day progresses   Can build in “listening breaks” to help with this  Breaks BEFORE you do a good deal of listening (spend time not listening before you go into a car, before you have appointments, before dinner engagements)  Breaks DURING listening- try to take a break before you start having challenges (if you normally can go 30 minutes before it's hard to listen, try to take a small break every 20 minutes so you're not getting listening fatigue/can handle a longer conversation    Less common to have challenges  in the morning that get better during the day  Look at your routine to see if you're doing anything in the “challenging” mornings that are making it harder  Are you listening to news, having conversations with your wife?  Those may wear you out faster  Do you have more tinnitus that day?  How was sleep the night before?    Phones:  May need to turn up phone to full volume and turn up cochlear implant during conversations to make it loud enough to hear    Does muffled sound get better (hearing those closer to you better than the conversation at the other table)- should get better but will likely always be a challenge    Challenges in cars: very common to have trouble hearing in car; best to keep cochlear implant on so that you can hear sirens, but note that it's going to be much harder to participate in conversations, listen to radio successfully.  Mini-kiran might help with conversations in the car    Does hearing in noise get better- yes, it generally gets better but usually never “perfect”- it is one of the most frequent concerns we hear from users of cochlear implants- they generally report it is better than it was with hearing aids but it is still a challenge    Does echo get better: generally yes with time and as you are able to process what you are hearing better (improved listening skills), but I am not sure why today it is worse for your voice than for other voices you're hearing    Consultations/Referrals/Coordination of Services: Follow up with ENT and Audiology as recommended.

## 2024-09-16 ENCOUNTER — TREATMENT (OUTPATIENT)
Dept: SPEECH THERAPY | Facility: CLINIC | Age: 79
End: 2024-09-16
Payer: MEDICARE

## 2024-09-16 DIAGNOSIS — H90.3 SENSORINEURAL HEARING LOSS (SNHL) OF BOTH EARS: Primary | ICD-10-CM

## 2024-09-16 DIAGNOSIS — R48.8 OTHER SYMBOLIC DYSFUNCTIONS: ICD-10-CM

## 2024-09-16 PROCEDURE — 92507 TX SP LANG VOICE COMM INDIV: CPT | Mod: GN

## 2024-09-16 ASSESSMENT — PAIN - FUNCTIONAL ASSESSMENT: PAIN_FUNCTIONAL_ASSESSMENT: 0-10

## 2024-09-16 ASSESSMENT — PAIN SCALES - GENERAL: PAINLEVEL_OUTOF10: 0 - NO PAIN

## 2024-09-16 NOTE — PROGRESS NOTES
Speech-Language Pathology    SLP Adult Outpatient Speech-Language Pathology Treatment     Patient Name: Neymar Hannon  MRN: [id number]  Today's Date: 9/16/2024  Time Calculation  Start Time: 0725  Stop Time: 0820  Time Calculation (min): 55 min         Current Problem:   1. Sensorineural hearing loss (SNHL) of both ears        2. Other symbolic dysfunctions              General Visit Information:   Past Medical History Relevant to Rehab: Hearing Loss  Patient Seen During This Visit: Yes  Arrival: Independent  Certification Period Start Date: 07/02/24  Certification Period End Date: 10/02/24  Number of Authorized Treatments : 5  Total Number of Visits : 9      Pain Assessment:   Pain Assessment: 0-10  0-10 (Numeric) Pain Score: 0 - No pain      Objective     Hearing:  Hearing Interventions: Provided  Hearing Comments: Continue Plan of Care    Outpatient Education:  Adult Outpatient Education  Individual(s) Educated: Patient  Written Education : Home Program  Verbal Education : Progress; Home Program  Risk and Benefits Discussed with Patient/Caregiver/Other: yes  Patient/Caregiver Demonstrated Understanding: yes  Plan of Care Discussed and Agreed Upon: yes  Patient Response to Education: Patient/Caregiver Verbalized Understanding of Information    SLP Assessment:  SLP TX Intervention Outcome: Making Progress Towards Goals  SLP Assessment Results: Other (Comment)  Prognosis: Excellent  Treatment Tolerance: Patient tolerated treatment well  Barriers: None  Education Provided: Yes       Plan:  Inpatient/Swing Bed or Outpatient: Outpatient  Treatment/Interventions: Aural rehab  SLP TX Plan: Continue Plan of Care  SLP Plan: Skilled SLP  SLP Frequency:  (1x/mo)  Duration: 6 months  Discussed POC: Patient  Discussed Risks/Benefits: Patient  Patient/Caregiver Agreeable: Yes  SLP - OK to Discharge: Yes      Subjective   Current Problem:  MODERATE Auditory Skills Deficits impacting communication    Patient reports increased  echoing in past week, particularly with own voice, that is impeding comprehension. Reports seeing  audiology next week.  Continued challenges in noise.      Objective     Hearing:  Hearing Interventions: Provided  Hearing Comments: Continue Plan of Care    Long Term Goal:  Comprehend conversation with 80% accuracy in 6 months    Short Term Goals:  Identify consonants varying in manner, voicing, or place cues with 90% accuracy in 3 months  Date Initiated: 7/16/2024  PROGRESS:  DID NOT TARGET  STATUS:  CONTINUE goal; making anticipated progress      Comprehend sentences in noise with no visual cues with 90% accuracy in 6 months  Date Initiated: 4/3/2024  PROGRESS: Comprehended paragraph in loud noise with speaker on contralateral side with 60% accuracy; increased to 90% with 1 repetition.  STATUS:  CONTINUE goal; making anticipated progress    HOME PROGRAM:  Watch the news with sound on the background; Telephone with Confidence    Additional Education:    Discussion included:  Listening Expectations:    Does hearing in noise get better/situation where you can hear noise but not person right next to you- yes, it generally gets better but usually never “perfect”    Does echo get better: If echo is consistently worse then should consult audiology- if it's intermittent, likely not cochlear implant/programming related  Tinnitus- may get worse from time to time; if impacting conversation or preventing sleep, reach out to Dr. Boudreaux.  Programs from June 2025 (EAS means it is using the hearing aid part):   Position Program Description   1 Psyhcmap EAS SCAN   2 Psyhcmap EAS Restaurant    3 Psyhcmap EAS Outdoor    4 Psychmap without EAS SCAN     Consultations/Referrals/Coordination of Services: Follow up with ENT and Audiology as recommended.

## 2024-09-27 ENCOUNTER — CLINICAL SUPPORT (OUTPATIENT)
Dept: AUDIOLOGY | Facility: CLINIC | Age: 79
End: 2024-09-27
Payer: MEDICARE

## 2024-09-27 DIAGNOSIS — Z96.21 COCHLEAR IMPLANT IN PLACE: ICD-10-CM

## 2024-09-27 DIAGNOSIS — H90.3 SNHL (SENSORY-NEURAL HEARING LOSS), ASYMMETRICAL: Primary | ICD-10-CM

## 2024-09-27 PROCEDURE — 92604 REPROGRAM COCHLEAR IMPLT 7/>: CPT

## 2024-09-27 NOTE — PROGRESS NOTES
ADULT COCHLEAR IMPLANT OPTIMIZATION     Clinical Indication: sensorineural hearing loss    Internal Device:   Surgeon: Jaunsz  Surgery Date: 1/26/2024  Processor:  N8 w/o EAS (removed on 9/27/2024)  Audiologist: Kameron  Activation Date: 2/20/2024  Ear: Right    Serial Number:  [id number]  Magnet Strength: 2i, monitor. Ordered a 1i to be mailed to his house  Color: Beige    HISTORY:  Neymar was seen for optimization of his right device. He arrives reporting his sound quality has slowly been declining. He reports an echo but has difficulty reporting the presentation of the echo (particular voice, on top of speech, trailing speech, etc).      Position Program Description   1 Psychmap non EAS SCAN   2 Psychmap non EAS Restaurant    3 Psychmap non EAS Outdoor    4 Psychmap non EAS Non scan     Patient did not notice a perceptual change with EAS  and requested it be removed.     PROGRAM PARAMETERS:  Programming was completed using the external processor.  Impedances were within normal limits. Data logging indicate about 15.1 hours. Programming was completed based on the patient's perception of the echo and 10 point loudness scale with comfortable loud being the target volume. We discussed in depth realistic expectations and that the echo he is hearing is likely normal and he should continue to practice his AVT to determine if the echo will resolve. I provided him with access to sensitivity, master volume, base and treble control. He is still exceeding expectations of his cochlear progression.      He leaves on his original MAP 4 NON EAS today, programming was attempted but he reported his had a negative impact on his sound quality so his previous map was used.     PATIENT EDUCATION:   Discussed results and recommendations with patient.  Questions were addressed and the patient was encouraged to contact our department should concerns arise.    Follow up as planned in November  Requesting Katharine be on  site for November appointment   Contact with questions and concerns     Time spent with patient: 1 hour and 10 minutes    Completed by:  Nba Goldsmith, CCC-A, [facility]  Licensed Audiologist

## 2024-11-04 ENCOUNTER — APPOINTMENT (OUTPATIENT)
Dept: SPEECH THERAPY | Facility: CLINIC | Age: 79
End: 2024-11-04
Payer: MEDICARE

## 2024-11-08 DIAGNOSIS — Z47.1 AFTERCARE FOLLOWING SHOULDER JOINT REPLACEMENT SURGERY, UNSPECIFIED LATERALITY: Primary | ICD-10-CM

## 2024-11-08 DIAGNOSIS — Z96.619 AFTERCARE FOLLOWING SHOULDER JOINT REPLACEMENT SURGERY, UNSPECIFIED LATERALITY: Primary | ICD-10-CM

## 2024-11-08 RX ORDER — AMOXICILLIN 500 MG/1
2000 TABLET, FILM COATED ORAL ONCE
Qty: 4 TABLET | Refills: 0 | Status: SHIPPED | OUTPATIENT
Start: 2024-11-08 | End: 2024-11-08

## 2024-11-18 ENCOUNTER — CLINICAL SUPPORT (OUTPATIENT)
Dept: AUDIOLOGY | Facility: CLINIC | Age: 79
End: 2024-11-18
Payer: MEDICARE

## 2024-11-18 ENCOUNTER — TREATMENT (OUTPATIENT)
Dept: SPEECH THERAPY | Facility: CLINIC | Age: 79
End: 2024-11-18
Payer: MEDICARE

## 2024-11-18 DIAGNOSIS — H90.3 SENSORINEURAL HEARING LOSS (SNHL) OF BOTH EARS: Primary | ICD-10-CM

## 2024-11-18 DIAGNOSIS — Z96.21 COCHLEAR IMPLANT IN PLACE: ICD-10-CM

## 2024-11-18 DIAGNOSIS — H90.3 ASYMMETRICAL SENSORINEURAL HEARING LOSS: Primary | ICD-10-CM

## 2024-11-18 DIAGNOSIS — R48.8 OTHER SYMBOLIC DYSFUNCTIONS: ICD-10-CM

## 2024-11-18 PROCEDURE — 92626 EVAL AUD FUNCJ 1ST HOUR: CPT | Mod: 59

## 2024-11-18 PROCEDURE — 92604 REPROGRAM COCHLEAR IMPLT 7/>: CPT

## 2024-11-18 PROCEDURE — 92507 TX SP LANG VOICE COMM INDIV: CPT | Mod: GN

## 2024-11-18 PROCEDURE — 92557 COMPREHENSIVE HEARING TEST: CPT | Mod: 59

## 2024-11-18 ASSESSMENT — PAIN - FUNCTIONAL ASSESSMENT: PAIN_FUNCTIONAL_ASSESSMENT: 0-10

## 2024-11-18 ASSESSMENT — PAIN SCALES - GENERAL: PAINLEVEL_OUTOF10: 0 - NO PAIN

## 2024-11-18 NOTE — PROGRESS NOTES
ADULT COCHLEAR IMPLANT OPTIMIZATION     Clinical Indication: sensorineural hearing loss    Internal Device:   Surgeon: Janusz  Surgery Date: 1/26/2024  Processor:  N8 w/o EAS (removed on 9/27/2024)  Audiologist: Kameron  Activation Date: 2/20/2024  Ear: Right    Serial Number:  [id number]  Magnet Strength: 2i, monitor. Ordered a 1i to be mailed to his house  Color: Beige    HISTORY:  Neymar was seen for optimization of his right device. He arrives reporting his sound quality has slowly been declining. He reports an echo but has difficulty reporting the presentation of the echo (particular voice, on top of speech, trailing speech, etc).      Position Program Description   1 Psychmap non EAS SCAN   2 Psychmap non EAS Restaurant    3 Psychmap non EAS Outdoor    4       Patient did not notice a perceptual change with EAS  and requested it be removed.     PROGRAM PARAMETERS:  Programming was completed using the external processor.  Impedances were within normal limits. Data logging indicate about 15.1 hours. Programming was completed based on the patient's perception of the echo and 10 point loudness scale with comfortable loud being the target volume. We discussed in depth realistic expectations and that the echo he is hearing is likely normal and he should continue to practice his AVT to determine if the echo will resolve. I provided him with access to sensitivity, master volume, base and treble control. He is still exceeding expectations of his cochlear progression.      He leaves on his original MAP 4 NON EAS today, programming was attempted but he reported his had a negative impact on his sound quality so his previous map was used.     PATIENT EDUCATION:   Discussed results and recommendations with patient.  Questions were addressed and the patient was encouraged to contact our department should concerns arise.    Follow up as planned in November  Requesting Katharine be on site for November  appointment   Contact with questions and concerns     Time spent with patient: 1 hour and 10 minutes    Completed by:  Nba Goldsmith, CCC-A, [facility]  Licensed Audiologist

## 2024-11-18 NOTE — PROGRESS NOTES
Speech-Language Pathology    SLP Adult Outpatient Speech-Language Pathology Treatment     Patient Name: Neymar Hannon  MRN: [id number]  Today's Date: 11/18/2024  Time Calculation  Start Time: 0900  Stop Time: 1000  Time Calculation (min): 60 min         Current Problem:   1. Sensorineural hearing loss (SNHL) of both ears  Follow Up In Speech Therapy      2. Other symbolic dysfunctions  Follow Up In Speech Therapy            General Visit Information:   Reason for Referral: Aural Rehabilitation  Referred By: Dr. Boudreaux  Past Medical History Relevant to Rehab: Hearing Loss  Patient Seen During This Visit: Yes  Arrival: Independent  Certification Period Start Date: 10/03/24  Certification Period End Date: 01/03/25  Number of Authorized Treatments : 5  Total Number of Visits : 10      Pain Assessment:   Pain Assessment: 0-10  0-10 (Numeric) Pain Score: 0 - No pain      Objective     Hearing:  Hearing Interventions: Provided  Hearing Comments: Continue Plan of Care    Outpatient Education:  Adult Outpatient Education  Individual(s) Educated: Patient  Written Education : Home Program  Verbal Education : Progress; Home Program  Risk and Benefits Discussed with Patient/Caregiver/Other: yes  Patient/Caregiver Demonstrated Understanding: yes  Plan of Care Discussed and Agreed Upon: yes  Patient Response to Education: Patient/Caregiver Verbalized Understanding of Information    SLP Assessment:  SLP TX Intervention Outcome: Making Progress Towards Goals  SLP Assessment Results: Other (Comment)  Prognosis: Excellent  Treatment Tolerance: Patient tolerated treatment well  Barriers: None  Education Provided: Yes       Plan:  Inpatient/Swing Bed or Outpatient: Outpatient  Treatment/Interventions: Aural rehab  SLP TX Plan: Continue Plan of Care  SLP Plan: Skilled SLP  SLP Frequency:  (1xmo)  Duration: 6 months  Discussed POC: Patient  Discussed Risks/Benefits: Patient  Patient/Caregiver Agreeable: Yes  SLP - OK to Discharge:  Yes      Subjective   Current Problem:  MODERATE Auditory Skills Deficits impacting communication    Patient seen with wife present and participating.  He is hearing some things better in terms of environmental noises but feels his understanding seems worse than it did a month ago.  At this time, he is reporting echo being worse in the morning than in the afternoon for the past week.  He feels he has experienced times where he hears better by turning off cochlear implant and using only his hearing aid (for example, heard a couple talking better when he took of CI).  He feels the echo is worse is quiet than when listening to talking.  He also reports that the /s/ is very noticeable.  He reports challenges in group situations but denies that there were other noise factors (music/TV).  He reports extreme frustration and feels, as reported in the past, that professionals are not appropriately sharing with him whether his experience is typical or atypical or what to expect.  Referenced notes from previous sessions that document answers to questions he asked and whether they are typical/atypical.        Objective     Hearing:  Hearing Interventions: Provided  Hearing Comments: Continue Plan of Care    Long Term Goal:  Comprehend conversation with 80% accuracy in 6 months    Short Term Goals:  Identify consonants varying in manner, voicing, or place cues with 90% accuracy in 3 months  Date Initiated: 7/16/2024  PROGRESS:  DID NOT TARGET  STATUS:  CONTINUE goal; making anticipated progress    Comprehend sentences in noise with no visual cues with 90% accuracy in 6 months  Date Initiated: 4/3/2024  PROGRESS: Sentences in quiet with 100% accuracy; sentences in environmental noise (loud) with 90% accuracy.  STATUS:  ACHIEVED goal; add goals for paragraphs in noise    Comprehend 3 paragraphs in environmental noise with 90% accuracy in 3 months  Date Initiated: 11/18/2024  PROGRESS:  Comprehended 1 paragraph in environmental  "noise with 80% accuracy.  STATUS:  CONTINUE goal; making anticipated progress    HOME PROGRAM:  Watch the news with sound on the background; Telephone with Confidence    Additional Education:    Discussion included:  Listening Expectations:  Does hearing in noise get better/situation where you can hear noise but not person right next to you- yes, it generally gets better but it may continue to be a challenge; home program will help with building listening in noise skills  It is most common to hear well in the morning and to have it harder to hear as the day progresses- the report this session that it is worse in the morning is a new finding for him compared to previous sessions and is not generally what we expect.  I would look at what he is doing the day/night before/ is he waking up tired?  May be residual fatigue.  Is it typical to hear worse for days after a loud event- some people do get auditory fatigue that takes an extended recuperation period; anticipate it is auditory fatigue and not a problem with the cochlear implant  Does muffled sound get better (hearing those closer to you better than the conversation at the other table)- should get better with practice of home program but will likely always be a challenge   Is echo typical: generally gets better with time and increased listening skills  and as you are able to process what you are hearing better (improved listening skills).  Echo tends to be consistent for people and not as inconsistent as he is reporting  Communication strategies with wife:  No communication unless face to face; patient doesn't like shoulder tap for attention so devise a mutually agreed upon solution; neither party should be ending conversations with \"nevermind\" and walking away as that causes frustration; \"Mr. Duran talking\" with pauses every 5 words to allow more time for processing.  Progress with CI is demonstrating skills better than what he did with his hearing " aid    Consultations/Referrals/Coordination of Services: Follow up with ENT and Audiology as recommended.

## 2024-12-09 DIAGNOSIS — Z47.1 AFTERCARE FOLLOWING SHOULDER JOINT REPLACEMENT SURGERY, UNSPECIFIED LATERALITY: Primary | ICD-10-CM

## 2024-12-09 DIAGNOSIS — Z96.619 AFTERCARE FOLLOWING SHOULDER JOINT REPLACEMENT SURGERY, UNSPECIFIED LATERALITY: Primary | ICD-10-CM

## 2024-12-09 RX ORDER — AMOXICILLIN 500 MG/1
2000 TABLET, FILM COATED ORAL ONCE
Qty: 4 TABLET | Refills: 0 | Status: SHIPPED | OUTPATIENT
Start: 2024-12-09 | End: 2024-12-09

## 2025-03-12 ENCOUNTER — CLINICAL SUPPORT (OUTPATIENT)
Dept: AUDIOLOGY | Facility: CLINIC | Age: 80
End: 2025-03-12
Payer: MEDICARE

## 2025-03-12 DIAGNOSIS — Z96.21 COCHLEAR IMPLANT IN PLACE: Primary | ICD-10-CM

## 2025-03-12 DIAGNOSIS — H90.3 ASYMMETRICAL SENSORINEURAL HEARING LOSS: ICD-10-CM

## 2025-03-12 NOTE — PROGRESS NOTES
ESTABLISHED PATIENT VISIT    Clinical Indication: sensorineural hearing loss    Internal Device:   Surgeon: Janusz  Surgery Date: 1/26/2024  Processor:  N8 w/o EAS (removed on 9/27/2024)  Audiologist: Kameron  Activation Date: 2/20/2024  Ear: Right    Serial Number:  [id number]  Magnet Strength: 1i  Color: Beige    INTAKE:  Mr. Hannon was seen today for a follow up visit due to complaints of intermittent ambient noise from his right CI. He wears a left hearing aid from an outside facility. He arrived today scheduled incorrectly with Nba Sims, instead of his CI audiologist, Nba Tobar. Patient was seen to address questions and concerns, will be rescheduled for CI mapping with Dr. Rashid. Mr. Hannon asked about explantation of cochlear implant and residual hearing of his right ear. He stated that he often hears ambient noise that is distracting from speakers in an environment. His wife reported that he hears what she is saying about 60% of the time. He also wonders if his concerns are hearing aid related and asked about purchasing the bimodal hearing aid through [employer]. Patient was counseled about his hearing loss and word understanding scores of his left ear, as well as residual hearing in right ear. Patient received a replacement processor in January due to broken ear hook. He stated that he only replaced the processor and used his previous coil. Visual inspection of his processor revealed a twisted and brittle coil that was replaced with clinic stock. Ordered RMA for a cable/coil and 1m magnet from [employer] that will be sent to the clinic for replacement (Order #[id number]). Listening check of the processor revealed adequate sound output. Patient will be called to schedule follow up appointment with Dr. Rashid to address CI programming and optimization. He also stated [place] for scheduling purposes.     PATIENT  EDUCATION:   Discussed results and recommendations with patient.  Questions were addressed and the patient was encouraged to contact our department should concerns arise.      PATTY Sierra  Audiology Doctoral Extern    Supervised by:  Nba Sims CCC-A  Clinical Audiologist

## 2025-03-19 ENCOUNTER — CLINICAL SUPPORT (OUTPATIENT)
Dept: AUDIOLOGY | Facility: CLINIC | Age: 80
End: 2025-03-19
Payer: MEDICARE

## 2025-03-19 DIAGNOSIS — Z96.21 COCHLEAR IMPLANT IN PLACE: ICD-10-CM

## 2025-03-19 DIAGNOSIS — H90.3 SENSORINEURAL HEARING LOSS, BILATERAL: Primary | ICD-10-CM

## 2025-03-19 PROCEDURE — 92604 REPROGRAM COCHLEAR IMPLT 7/>: CPT

## 2025-03-19 NOTE — PROGRESS NOTES
ADULT COCHLEAR IMPLANT OPTIMIZATION     Clinical Indication: sensorineural hearing loss    Internal Device:   Surgeon: Janusz  Surgery Date: 1/26/2024  Processor:  N8 w/o EAS (removed on 9/27/2024)  Audiologist: Kameron  Activation Date: 2/20/2024  Ear: Right    Serial Number:  [id number]  Magnet Strength: 2i, monitor. Ordered a 1i to be mailed to his house  Color: Beige    HISTORY:  Neymar was seen for optimization of his right device. He arrives reporting his sound quality has slowly been declining. He reports an echo but has difficulty reporting the presentation of the echo (particular voice, on top of speech, trailing speech, etc).      Position Program Description   1 Psychmap non EAS SCAN   2 Psychmap non EAS Restaurant    3 Psychmap non EAS Outdoor    4       Patient did not notice a perceptual change with EAS  and requested it be removed.     PROGRAM PARAMETERS:  Programming was completed using the external processor.  Impedances were within normal limits. Data logging indicate about 15.1 hours. Programming was completed based on the patient's perception of the echo and 10 point loudness scale with comfortable loud being the target volume. We discussed in depth realistic expectations and that the echo he is hearing is likely normal and he should continue to practice his AVT to determine if the echo will resolve. I provided him with access to sensitivity, master volume, base and treble control. He is still exceeding expectations of his cochlear progression.      He leaves on his original MAP 4 NON EAS today, programming was attempted but he reported his had a negative impact on his sound quality so his previous map was used.     PATIENT EDUCATION:   Discussed results and recommendations with patient.  Questions were addressed and the patient was encouraged to contact our department should concerns arise.    Follow up as planned in November  Requesting Katharine be on site for November  Name: Kenzie Al      : 1959      MRN: [id number]  Encounter Provider: Maura Mcintosh DO  Encounter Date: 3/14/2025   Encounter department: [facility]    Assessment & Plan  Medicare annual wellness visit, subsequent    -Medicare annual wellness visit done   - last Cologuard test was done in  and she will be due 2025.  Will order Cologuard today.  -  Lung cancer screen is not indicated because she quit smoking cigarettes over 20 years ago.  - DEXA scan was done in 2023 and showed osteopenia.  Will order repeat DEXA scan to be done in 2025.  She was counseled to continue with vitamin D, calcium supplements and weightbearing exercise.  -last mammogram was done over 2 years ago and was ordered but patient is yet to get it done.  Will order another mammogram today.  -She is up-to-date with 2 COVID vaccines, pneumococcal vaccine and Tdap and does not want any more vaccines.  -follow-up in 6 months.    Essential hypertension         Mild intermittent reactive airway disease without complication         Reactive depression         Prediabetes         Other hyperlipidemia    Orders:  •  pitavastatin (LIVALO) 2 mg; Take 1 tablet (2 mg total) by mouth daily with dinner  •  Hepatic function panel; Future  •  Lipid panel; Future    Encounter for screening mammogram for breast cancer         Screening for colorectal cancer    Orders:  •  Cologuard    Osteopenia of other site    Orders:  •  DXA bone density spine hip and pelvis; Future    Encounter for immunization         On statin therapy    Orders:  •  Hepatic function panel; Future    Marijuana use           Depression Screening and Follow-up Plan: Patient was screened for depression during today's encounter. They screened negative with a PHQ-9 score of 1.      Urinary Incontinence Plan of Care: counseling topics discussed: practice Kegel (pelvic floor strengthening) exercises, limiting fluid  appointment   Contact with questions and concerns     Time spent with patient: 1 hour and 10 minutes    Completed by:  Nba Goldsmith, CCC-A, [facility]  Licensed Audiologist    intake 3-4 hours before bed and preventing constipation.       Preventive health issues were discussed with patient, and age appropriate screening tests were ordered as noted in patient's After Visit Summary. Personalized health advice and appropriate referrals for health education or preventive services given if needed, as noted in patient's After Visit Summary.    History of Present Illness     HPI   Patient Care Team:  Maura Mcintosh DO as PCP - General (Internal Medicine)    Review of Systems  Medical History Reviewed by provider this encounter:  Tobacco  Allergies  Meds  Problems  Med Hx  Surg Hx  Fam Hx       Annual Wellness Visit Questionnaire   Kenzie is here for her Subsequent Wellness visit. Last Medicare Wellness visit information reviewed, patient interviewed and updates made to the record today.      Health Risk Assessment:   Patient rates overall health as good. Patient feels that their physical health rating is slightly better. Patient is satisfied with their life. Eyesight was rated as slightly worse. Hearing was rated as same. Patient feels that their emotional and mental health rating is slightly worse. Patients states they are never, rarely angry. Patient states they are never, rarely unusually tired/fatigued. Pain experienced in the last 7 days has been some. Patient's pain rating has been 2/10. Patient states that she has experienced no weight loss or gain in last 6 months.     Depression Screening:   PHQ-9 Score: 1      Fall Risk Screening:   In the past year, patient has experienced: no history of falling in past year      Urinary Incontinence Screening:   Patient has leaked urine accidently in the last six months. Cough or sneeze    Home Safety:  Patient does not have trouble with stairs inside or outside of their home. Patient has working smoke alarms and has working carbon monoxide detector. Home safety hazards include: none.     Nutrition:   Current diet is Regular.      Medications:   Patient is currently taking over-the-counter supplements. OTC medications include: see medication list. Patient is able to manage medications.     Activities of Daily Living (ADLs)/Instrumental Activities of Daily Living (IADLs):   Walk and transfer into and out of bed and chair?: Yes  Dress and groom yourself?: Yes    Bathe or shower yourself?: Yes    Feed yourself? Yes  Do your laundry/housekeeping?: Yes  Manage your money, pay your bills and track your expenses?: Yes  Make your own meals?: Yes    Do your own shopping?: Yes    Previous Hospitalizations:   Any hospitalizations or ED visits within the last 12 months?: No      Advance Care Planning:   Living will: Yes    Durable POA for healthcare: Yes    Advanced directive: Yes      PREVENTIVE SCREENINGS      Cardiovascular Screening:    General: Screening Not Indicated and History Lipid Disorder      Diabetes Screening:     General: Screening Current      Colorectal Cancer Screening:     General: Screening Current      Breast Cancer Screening:     General: Screening Current      Cervical Cancer Screening:    General: Screening Not Indicated      Lung Cancer Screening:     General: Screening Not Indicated      Hepatitis C Screening:    General: Screening Current    Screening, Brief Intervention, and Referral to Treatment (SBIRT)     Screening  Typical number of drinks in a day: 2  Typical number of drinks in a week: 14  Interpretation: Low risk drinking behavior.    Single Item Drug Screening:  How often have you used an illegal drug (including marijuana) or a prescription medication for non-medical reasons in the past year? daily or almost daily    Single Item Drug Screen Score: 4  Interpretation: POSITIVE screen for possible drug use disorder    Drug Abuse Screening Test (DAST-10):  1) Have you used drugs other than those required for medical reasons? Yes  2) Do you abuse more than one drug at a time? No  3) Are you always able to stop using  "drugs when you want to? Yes  4) Have you had \"blackouts\" or \"flashbacks\" as a result of drug use? No  5) Do you ever feel bad or guilty about your drug use? No  6) Does your spouse (or parents) ever complain about your involvement with drugs? No  7) Have you neglected your family because of your use of drugs? No  8) Have you engaged in illegal activities in order to obtain drugs? No  9) Have you ever experienced withdrawal symptoms (felt sick) when you stopped taking drugs? No  10) Have you had medical problems as a result of your drug use (e.g., memory loss, hepatitis, convulsions, bleeding, etc.)? Yes    DAST-10 Score: 2  Interpretation: Low level problems related to drug abuse    Social Drivers of Health     Financial Resource Strain: Low Risk  (1/26/2024)    Overall Financial Resource Strain (CARDIA)    • Difficulty of Paying Living Expenses: Not hard at all   Food Insecurity: No Food Insecurity (3/14/2025)    Hunger Vital Sign    • Worried About Running Out of Food in the Last Year: Never true    • Ran Out of Food in the Last Year: Never true   Transportation Needs: No Transportation Needs (3/14/2025)    PRAPARE - Transportation    • Lack of Transportation (Medical): No    • Lack of Transportation (Non-Medical): No   Housing Stability: Low Risk  (3/14/2025)    Housing Stability Vital Sign    • Unable to Pay for Housing in the Last Year: No    • Number of Times Moved in the Last Year: 1    • Homeless in the Last Year: No   Utilities: Not At Risk (3/14/2025)    [facility] Utilities    • Threatened with loss of utilities: No     No results found.    Objective   /80 (BP Location: Left arm, Patient Position: Sitting, Cuff Size: Standard)   Pulse 71   Temp 99.2 °F (37.3 °C) (Temporal)   Resp 18   Ht 5' 6.5\" (1.689 m)   Wt 67.8 kg (149 lb 6.4 oz)   SpO2 96%   BMI 23.75 kg/m²     Physical Exam    "

## 2025-04-10 ENCOUNTER — CLINICAL SUPPORT (OUTPATIENT)
Dept: AUDIOLOGY | Facility: CLINIC | Age: 80
End: 2025-04-10
Payer: MEDICARE

## 2025-04-10 DIAGNOSIS — H90.3 SENSORINEURAL HEARING LOSS, BILATERAL: Primary | ICD-10-CM

## 2025-04-10 DIAGNOSIS — Z96.21 COCHLEAR IMPLANT IN PLACE: ICD-10-CM

## 2025-04-10 PROCEDURE — 92626 EVAL AUD FUNCJ 1ST HOUR: CPT

## 2025-04-10 ASSESSMENT — PAIN - FUNCTIONAL ASSESSMENT: PAIN_FUNCTIONAL_ASSESSMENT: 0-10

## 2025-04-10 ASSESSMENT — PAIN SCALES - GENERAL: PAINLEVEL_OUTOF10: 0 - NO PAIN

## 2025-04-10 NOTE — PROGRESS NOTES
ADULT COCHLEAR IMPLANT FUNCTIONAL TESTING     Clinical Indication: sensorineural hearing loss    Internal Device:   Surgeon: Janusz  Surgery Date: 1/26/2024  Processor:  N8 w/o EAS (removed on 9/27/2024)  Audiologist: Kameron  Activation Date: 2/20/2024  Ear: Right    Serial Number:  [id number]  Magnet Strength: 2i, monitor. Ordered a 1i to be mailed to his house  Color: Beige    HISTORY:  Neymar was seen for functional testing of his right cochlear implant. He was last seen on 3/19/2025 where programming changes were made and he was advised to try all programs for days at a time. He reports he is hearing better than previously in all of them but that he still He reports drastic swings in sound quality that vary in the same listening environments, different listening environment, over the course of an hour and over the course of the day. It is difficult to isolate the specific cause based on the varying recollection. We have previously replaced his processor with no improvement. These complaints have been ongoing as well as the varying recollections. He reports an echo but has difficulty reporting the presentation of the echo (particular voice, on top of speech, trailing speech, etc) that is sometimes present but sometime absent. He reports in the same listening environment large changes in sound quality. He denies concerns for cognitive change.      Position Program Description   1 MAP 36 Psychmap NON SCAN   2 Map 36 Reduced Dynamic Range NON SCAN   3 MAP 39 Rate 500 Hz population mean NON SCAN   4 MAP 40 MAXIMA 10 population mean NON SCAN     Patient previously did not notice a perceptual change with EAS  and requested it be removed.     PROGRAM PARAMETERS:  No programming changes made today. I encourage him to continue the different programs and wear where device sounds best.     R = Right CI (opposite ear plugged and masked)   SRT = 15 dB HL  CNC words in quiet at 50 dB HL: 70%  AzBio in  quiet at 50 dB HL  = 85%  AzBio in noise at 50 dB HL with 40 dB noise  = 17%  Pure tone responses from 250 Hz - 6000 Hz were in the normal to mild hearing loss range.     PATIENT EDUCATION:  Follow up after trip for integrity testing   Updated cognitive evaluation after integrity testing  Updated imaging if appropriate    Time spent with patient: 1 hour   Completed by:  Kyleigh Rashid, Nba, CCC-A, [facility]  Licensed Audiologist

## 2025-05-05 ENCOUNTER — EVALUATION (OUTPATIENT)
Dept: SPEECH THERAPY | Facility: CLINIC | Age: 80
End: 2025-05-05
Payer: MEDICARE

## 2025-05-05 DIAGNOSIS — H90.3 SENSORINEURAL HEARING LOSS (SNHL) OF BOTH EARS: ICD-10-CM

## 2025-05-05 DIAGNOSIS — R48.8 OTHER SYMBOLIC DYSFUNCTIONS: Primary | ICD-10-CM

## 2025-05-05 DIAGNOSIS — R41.841 COGNITIVE COMMUNICATION DEFICIT: ICD-10-CM

## 2025-05-05 PROCEDURE — 92523 SPEECH SOUND LANG COMPREHEN: CPT | Mod: GN

## 2025-05-05 ASSESSMENT — PAIN - FUNCTIONAL ASSESSMENT: PAIN_FUNCTIONAL_ASSESSMENT: 0-10

## 2025-05-05 ASSESSMENT — ENCOUNTER SYMPTOMS
LOSS OF SENSATION IN FEET: 0
DEPRESSION: 0
OCCASIONAL FEELINGS OF UNSTEADINESS: 0

## 2025-05-05 ASSESSMENT — PAIN SCALES - GENERAL: PAINLEVEL_OUTOF10: 0 - NO PAIN

## 2025-05-05 NOTE — PROGRESS NOTES
Speech-Language Pathology    Auditory Evaluation- Cognitive Evaluation      Patient Name: Neymar Hannon  MRN: [id number]  Today's Date: 5/5/2025  Time Calculation  Start Time: 1305  Stop Time: 1415  Time Calculation (min): 70 min         Current Problem:  Problem List[1]     Impressions and Recommendations:  NORMAL cognitive overall score with mild deficits in Symbol Cancellation.  Some drops noted (but still within normal limits) from December 2023 cognitive testing in the domains of Attention and Visuospatial skills  Recommendations: Audiology, ENT work up for current cochlear implant and hearing options    Resonance-Voice Assessment:  Assessments Used: Informal  Articulation Screening: Age appropriate  Nasal Resonance: Normal  Nasal Air Emissions: Not present  Voice: Normal  Speech Inteligibility: 100%    Patient Subjective Assessment:   Patient with continued dissatisfaction with cochlear implant citing variable echo that impedes his ability to hear.  He feels he hears more environmental sounds with the cochlear implant but that he has never obtained the speech recognition that he was desiring.     Auditory Skill Assessment:       Auditory Plan of Care:  Recommend Treatment: No  Discussed POC: Patient  The patient's family/caregiver was educated regarding appropriate motivation and expectations for a cochlear implant (CI) and the CI process.: Yes      Subjective     General Visit Information:  Recommendations: Audiology, ENT  Living Environment: Home  Language at home: Spoken English  Arrival: Independent  Reason for Referral: Other: (comment) (Cognitive Assessment)  Certification Period Start Date: 05/05/25  Certification Period End Date: 05/06/25  Prior Level of Function: WFL    Provider:  Audiology: Kyleigh Rashid  ENT: Mary Kay Boudreaux    Pain:  Pain Assessment  Pain Assessment: 0-10  0-10 (Numeric) Pain Score: 0 - No pain    Objective     Baseline Observations:  Hearing Loss: Progressive (Over time)  Duration of  Hearing Loss: 20+ years  Etiology of hearing loss: unknown  Current Amplification: Worn during evaluation  Left Ear: Hearing Aid  Right Ear: Cochlear Implant  Date of amplification right: 2024  Hours Worn: Whenever awake  Cognition Skill Assessment:  CLQT: Personal Facts Score: 8  CLQT: Personal Facts Function: WFL  CLQT: Symbol Cancellation Score: 9  CLQT: Symbol Cancellation Function: Below Cutoff  CLQT: Confrontational Naming Score: 10  CLQT: Confrontational Naming Function: WFL  CLQT: Clock Drawing Score: 13  CLQT: Clock Drawing Function: WFL  CLQT: Story Retelling Score: 9  CLQT: Story Retelling Function: WFL  CLQT: Symbol Trails Score: 7  CLQT: Symbol Trails Function: WFL  CLQT: Generative Naming Score: 5  CLQT: Generative Naming Function: WFL  CLQT: Design Memory Score: 6  CLQT: Design Memory Function: WFL  CLQT: Mazes Score: 8  CLQT: Mazes Function: WFL  CLQT: Design Generation Score: 6  CLQT: Design Generation Function: WFL  CLQT: Auditory Comprehension Score: 19  CLQT: Auditory Comprehension Function: WFL    SRCD - Severity Rating Cognitive Domains:  SRCD: Attention Score: 170  SRCD: Attention Ratin  SRCD: Attention Function: WFL  SRCD: Memory Score: 175  SRCD: Memory Ratin  SRCD: Memory Function: WFL  SRCD: Executive Functions Score: 26  SRCD: Executive Functions Ratin  SRCD: Executive Functions Function: WFL  SRCD: Language Score: 32  SRCD: Language Ratin  SRCD: Language Function: WFL  SRCD: Visuospatial Skills Score: 86  SRCD: Visuospatial Skills Ratin  SRCD:Visuospatial Skills Function: WFL  SRCD: Non-Linguistic Cognition Score: 36  SRCD: Non-Linguistic Cognition Ratin  SRCD: Non-Linguistic Cognition Function: WFL  SRCD: Linguistic Cognition Score: 51  SRCD: Linguistic Cognition Ratin  SRCD: Linguistic Cognition Function: WFL  SRCD: Clock Drawing Severity Rating Score: 13  SRCD: Clock Drawing Severity Rating Rate: 4  SRCD: Clock Drawing Severity Rating Function:  WFL  SRCD: Generative Naming Perseveration Ratio Score: 0.05  SRCD: Generative Naming Perseveration Ratio Function: WFL  SRCD: Composite Severity Score Rating Function: 4.0  SRCD: Composite Severity Score Rating Function: WFL    Auditory Education:  Treatment Performed Today: No  Individual(s) Educated: Patient  Verbal Education Provided: Results of Testing  Response to Educaton: Verbalized Understanding  Patient's Learning Preference(s): Explanation/Discussion  Patient/caregiver verbalized understanding and agreement.: Yes    Provided education regarding differences between CI, no CI, HA, and EAS and what to expect from each option.  Patient expressed interest in trialing HA in his current implanted ear to use residual hearing.  Deferred to audiology and ENT teams.       [1]   Patient Active Problem List  Diagnosis    Acromioclavicular joint separation, type 3, right, initial encounter    Shoulder arthritis    Sensorineural hearing loss (SNHL) of both ears    Cognitive communication deficit    Other symbolic dysfunctions    Vertigo    Cochlear implant in place    Post-operative state

## 2025-05-23 DIAGNOSIS — Z96.619 AFTERCARE FOLLOWING SHOULDER JOINT REPLACEMENT SURGERY, UNSPECIFIED LATERALITY: Primary | ICD-10-CM

## 2025-05-23 DIAGNOSIS — Z47.1 AFTERCARE FOLLOWING SHOULDER JOINT REPLACEMENT SURGERY, UNSPECIFIED LATERALITY: Primary | ICD-10-CM

## 2025-05-23 RX ORDER — AMOXICILLIN 500 MG/1
2000 TABLET, FILM COATED ORAL ONCE
Qty: 4 TABLET | Refills: 0 | Status: SHIPPED | OUTPATIENT
Start: 2025-05-23 | End: 2025-05-23

## 2025-06-03 ENCOUNTER — CLINICAL SUPPORT (OUTPATIENT)
Dept: AUDIOLOGY | Facility: CLINIC | Age: 80
End: 2025-06-03
Payer: MEDICARE

## 2025-06-03 DIAGNOSIS — H90.3 SENSORINEURAL HEARING LOSS, BILATERAL: Primary | ICD-10-CM

## 2025-06-03 PROCEDURE — V5299 HEARING SERVICE: HCPCS | Mod: AUDSP

## 2025-06-05 NOTE — PROGRESS NOTES
HEARING AID TRIAL: bimodal   HISTORY  Neymar was seen to obtain Resound hearing aids due to extreme perceptual difficulty with his right [employer] processor. He is scheduled for a right integrity test and reprogramming on 6/17. He has requested to try hearing aids as a set and in the bimodal condition prior to that.    RECALL  He reports he is hearing better than previously in all of them but that he still He reports drastic swings in sound quality that vary in the same listening environments, different listening environment, over the course of an hour and over the course of the day. It is difficult to isolate the specific cause based on the varying recollection. We have previously replaced his processor with no improvement. These complaints have been ongoing as well as the varying recollections. He reports an echo but has difficulty reporting the presentation of the echo (particular voice, on top of speech, trailing speech, etc) that is sometimes present but sometime absent. He reports in the same listening environment large changes in sound quality. He denies concerns for cognitive change.       Position Program Description   1 MAP 36 Psychmap NON SCAN   2 Map 36 Reduced Dynamic Range NON SCAN   3 MAP 39 Rate 500 Hz population mean NON SCAN   4 MAP 40 MAXIMA 10 population mean NON SCAN      Patient previously did not notice a perceptual change with EAS  and requested it be removed.    HEARING AIDS  Re[employer] OMNIA 9  SN: [id number] and [id number]  Length 2 MP  with small power dome  Paired to phone  Paired to Smart 3D    Patient is encouraged to keep until integrity test  Will wear as full hearing aid set and as a bimodal pair    Impedances were appropriate today  In the mean time I am RMAing a backup processor incase it is needed during integrity test.     Otoscopic inspection indicated clear ear canals and visualization of the tympanic membrane bilaterally.   Aids were set to a target gain  of 100 %  Fitting formula: NAL NL2  Feedback manager was run today.     TREATMENT PLAN  -Follow up on 6/17 for integrity testing with [employer] rep and programming  -Full time use of hearing aids and/or bimodal set up with CI and hearing aid     Time Stamp: 15 minutes    Completed by:  Nba Stevens, CCC-A  Licensed Audiologist

## 2025-06-17 ENCOUNTER — APPOINTMENT (OUTPATIENT)
Dept: AUDIOLOGY | Facility: CLINIC | Age: 80
End: 2025-06-17
Payer: MEDICARE

## 2025-06-17 ENCOUNTER — CLINICAL SUPPORT (OUTPATIENT)
Dept: AUDIOLOGY | Facility: HOSPITAL | Age: 80
End: 2025-06-17
Payer: MEDICARE

## 2025-06-17 DIAGNOSIS — H90.3 SENSORINEURAL HEARING LOSS, BILATERAL: Primary | ICD-10-CM

## 2025-06-17 DIAGNOSIS — Z96.21 COCHLEAR IMPLANT IN PLACE: ICD-10-CM

## 2025-06-17 PROCEDURE — 92604 REPROGRAM COCHLEAR IMPLT 7/>: CPT | Performed by: AUDIOLOGIST

## 2025-06-17 NOTE — PROGRESS NOTES
AUDIOLOGY COCHLEAR IMPLANT PROGRAMMING      Name: Neymar Hannon  : 1945  Age: 79 y.o.  Date: 25  Time: 5634-4918    Plan:     Neymar Hannon here for CI follow-up.  Has right  cochlear implant, N8 processor, hearing preserved.  Significant improvement in speech understanding but has had several issues with echos in sound.  Measured ESRTs, made new MAP using ESRT contour and reprogrammed in EAS mode.  Primary limitation is that I cannot increase the stimulation levels too much before he finds the echos disturbing.  Right now, he still endorses echo, but it is reduced somewhat and the sound quality is acceptable.  See detailed notes below for all the changes I did. He does feel that things sound better, but there is still some low-level echo but it is hard to discern if there are specific sounds associated with the echo.  Follow-up with his primary audiologist Dr. Rashid in one month.  I have reached out to scheduling team. Note that I did not have an N8 acoustic earhook on hand - will need to get one when he follows-up with Dr. Rashid             History of Present Illness  Neymar Hannon, 79 y.o., here for CI follow-up.    Right Ear:  cochlear implant, N8 processor, hearing preserved  Surgery Date: 24.   Surgeon: Mary Kay Boudreaux MD  Initial activation date: 2024    Primary audiologist is Dr. Rashid.   See her notes for detailed hx  Main issues  Echoes in speech  Fluctuations in sound quality.  Has hearing preserved, but discontinued EAS    Nba Izquierdo, Clinical Specialist from [facility], here for integrity testing.       Procedure    Integrity test completed    ESRTs obtained using 678 Hz probe tone in contralateral ear and using 900 Hz, 25 pulse width stimulus delivered to CI ear.  Used [employer] EP.  ESRT threshold profile saved in Custom Sound.    Impedance: Normal  Electrodes deactivated: None  Datalogging:        Programming:    Made new MAP via population mean and  using ESRT contour.  Programmed in EAS mode as well.   Acoustic Frequency Range  Hz (per pt preference). Rolled off mid-frequency range  Electric Frequency Range 688-7938 Hz  Increased C levels to the point where it was loud but comfortable, but this caused echo percept.  Reduced C slightly to the point where echo was substantial reduced (but this compromised his ability to hear me), then increased it slightly.  Then maxed out acoustic gain and increased acoustic bandwidth to 1225 Hz, which increased his loudness percept and improved sound quality.   Tried increased maxima, but did not like it.    :::::: Final Programs ::::::    Program MAP Comments   1 41 SCAN 2, EAS, ESRT based MAP   2 37 Previous favorite (based on data logging)   3 --    4 --      Other Parameters: ACE, MP1+2, Pulse width = 37 us, Rate = 900 Hz, Maxima = 8, Loudness = 6; Sensitivity = 12    Requested his CI be linked to his demo ReSound hearing aid  I was not successful despite multiple attempts.  ReSound hearing aids are loaner from clinic.     Nba Howe, PhD  Audiologist /  of Otolaryngology

## 2025-07-29 ENCOUNTER — CLINICAL SUPPORT (OUTPATIENT)
Dept: AUDIOLOGY | Facility: CLINIC | Age: 80
End: 2025-07-29
Payer: MEDICARE

## 2025-07-29 DIAGNOSIS — Z96.21 COCHLEAR IMPLANT IN PLACE: ICD-10-CM

## 2025-07-29 DIAGNOSIS — H90.3 SENSORINEURAL HEARING LOSS, BILATERAL: Primary | ICD-10-CM

## 2025-07-29 PROCEDURE — 92604 REPROGRAM COCHLEAR IMPLT 7/>: CPT | Performed by: AUDIOLOGIST

## 2025-07-29 NOTE — PROGRESS NOTES
ADULT COCHLEAR IMPLANT OPTIMIZATION     Clinical Indication: sensorineural hearing loss    Internal Device:   Surgeon: Janusz  Surgery Date: 1/26/2024  Processor:  N8 w/o EAS (removed on 9/27/2024, replaced in June 2025 and ultimately removed again by patient)  Activation Date: 2/20/2024  Ear: Right    Serial Number:  [id number]  Magnet Strength: 1i  Color: Beige    HISTORY:  Neymar was seen for counseling and optimization for his right cochlear implant. He continues to report drastic swings in sound quality that vary in the same listening environments, different listening environment, over the course of an hour and over the course of the day. It is difficult to isolate the specific cause. We have previously replaced his processor with no improvement. These complaints have been ongoing as well as the varying recollections. He reports an echo but has difficulty reporting the presentation of the echo (particular voice, on top of speech, trailing speech, etc) that is sometimes present but sometime absent. He reports in the same listening environment large changes in sound quality. He denies concerns for cognitive change.      Position Program Description   1 MAP 42 Psychmap NON SCAN   2 Map 37 Old favorite reduced DR from 3.19.25 NON SCAN   3 MAP 43 Psychmap Ts and Cs reduced 5 units NON SCAN   4 MAP 39 Reduced stim rate 500 Hz population mean NON SCAN     Patient previously did not notice a perceptual change with EAS  and removed it.    RESULTS  Impedances were evaluated using the ear level [id number] speech processor for intra-cochlear electrodes 1-22 in common ground (CG), Monopolar 1 (MP1), Monopolar 2 (MP2) and MP1+2 stimulation modes.  Satisfactory impedances were found for all electrodes, in each stimulation mode.    The [id number] processor remains programmed in an MP1+2 stimulation mode using an ACE speech processing strategy, 8 maxima and a 900 Hz stimulation rate.  Electrodes 1-22 remain activated.   Using standard audiometric technique, a psychophysical map was created with good reliability.  Population mean was used to start, then C-levels measured in bands. No facial nerve stimulation was observed.  We discussed while patients have varying outcomes with cochlear implants these large, drastic changes in quality perception while in the same and varying environments are not typical. He should try each map for several days to a week at time, to determine if there is an improvement in performance.     Datalogging = 16 hrs/day mostly in P1 (EAS MAP) with use of mobile device, [employer], Forward Focus reflected    DISCUSSION  Positive stimulation was obtained on electrodes 1-22.  A reliable psychophysical map was defined in the ACE speech processing strategy.  Appropriate behavior was observed when the Maps were tested.   The Nucleus Smart Paz was downloaded on patient's iPhone and the processor remains paired and connected.  The clinic loaner Resound Omnia for the left side was linked to the N8 processor successfully today. This required going into Resound SmartFit and unlinking the right hearing aid from the left in the fitting. This bimodal pairing will allow bimodal streaming. The Nucleus Smart Paz was re-installed and updated to allow bimodal control.  Master Volume Bass/Treble adjustments were reviewed today.    TREATMENT PLAN  1. Utilize the Nucleus  Cochlear Implant System and the [id number] speech processor daily using the most tolerated program.  2. Return for remapping and optimization of the [id number] speech processor  in approximately 6 months in order to optimize the psychophysical ACE map.  3. Utilize aural rehabilitation/auditory training techniques at home to improve speech understanding ability.  Continue formal auditory training through this facility as directed.  4. Return to Dr. Boudreaux  as directed for medical management.        Time spent with patient: 1 hour and 30 minutes  [id number]      Tatiana  Francisco, AuD, CCC-A

## (undated) DEVICE — BALL, FLUTED, 6 MM

## (undated) DEVICE — BALL, DIAMOND, 2 MM

## (undated) DEVICE — CATHETER, IV, ANGIOCATH, 20 G X 1.88 IN, FEP POLYMER

## (undated) DEVICE — PREP TRAY, SKIN, DRY, W/GLOVES

## (undated) DEVICE — STOCKINETTE, IMPERVIOUS, 9 X 48 IN, STERILE

## (undated) DEVICE — SYRINGE, MONOJECT, LUER LOCK, 3 CC, LF

## (undated) DEVICE — NEEDLE, HYPODERMIC, NEEDLE PRO, 25G X 1.5, ORANGE

## (undated) DEVICE — SUTURE, PLAIN, 5-0, 18 IN, PC1, YELLOW

## (undated) DEVICE — SYRINGE, 1 CC, LUER LOCK

## (undated) DEVICE — BALL, FLUTED, 4 MM

## (undated) DEVICE — DRESSING, EAR, GLASSCOCK, ADULT

## (undated) DEVICE — COTTON BALL, LARGE, STERILE

## (undated) DEVICE — STRIP, SKIN CLOSURE, STERI STRIP, REINFORCED, 0.5 X 4 IN

## (undated) DEVICE — GLOVE, SURGICAL, PROTEXIS PI , 6.5, PF, LF

## (undated) DEVICE — TOWEL, SURGICAL, NEURO, O/R, 16 X 26, BLUE, STERILE

## (undated) DEVICE — Device

## (undated) DEVICE — COVER, MICROSCOPE, ZEISS, 48X82, OPMI, STERILE

## (undated) DEVICE — SUTURE, VICRYL, 3-0,18 IN, SH, UNDYED

## (undated) DEVICE — CLEANER, WIPE, INSTRUMENT, 3.25 X 3.25 IN

## (undated) DEVICE — BALL, DIAMOND, 4 MM

## (undated) DEVICE — PAD, GROUNDING, ELECTROSURGICAL, W/9 FT CABLE, POLYHESIVE II, ADULT, LF

## (undated) DEVICE — SYRINGE, 60 CC, IRRIGATION, BULB, CONTRO-BULB, PAPER POUCH

## (undated) DEVICE — DRAPE, TOWEL, STERI DRAPE, 17 X 11 IN, PLASTIC, STERILE

## (undated) DEVICE — CONTAINER, SPECIMEN, 4 OZ, OR PEEL PACK, STERILE

## (undated) DEVICE — TUBING, SUCTION, OTOMED

## (undated) DEVICE — BUR, 1.5MM DIAMOND EXT

## (undated) DEVICE — TAPE, SILK, DURAPORE, 3 IN X 10 YD, LF

## (undated) DEVICE — CORD, BIPOLAR,  12 FT, DISPOSABLE, LF

## (undated) DEVICE — DRESSING, GAUZE, 16 PLY, 4 X 4 IN, STERILE

## (undated) DEVICE — DRAPE, SURGICAL, OTOLOGY GLASSCOCK

## (undated) DEVICE — COMB, HAIR, 7 IN, PLASTIC, BLACK

## (undated) DEVICE — DRESSING, TRANSPARENT, TEGADERM, 2-3/8 X 2-3/4 IN

## (undated) DEVICE — NEEDLE, HYPODERMIC, MONOJECT, 27 G X 1.5 IN

## (undated) DEVICE — SUTURE, MONOCRYLIC, 4-0, P3, MONO 18

## (undated) DEVICE — ELECTRODE, PAIRED SUBDERMAL OTO

## (undated) DEVICE — GOWN, ASTOUND, XL